# Patient Record
Sex: FEMALE | Race: BLACK OR AFRICAN AMERICAN | NOT HISPANIC OR LATINO | Employment: FULL TIME | URBAN - METROPOLITAN AREA
[De-identification: names, ages, dates, MRNs, and addresses within clinical notes are randomized per-mention and may not be internally consistent; named-entity substitution may affect disease eponyms.]

---

## 2019-03-15 ENCOUNTER — OFFICE VISIT (OUTPATIENT)
Dept: OBSTETRICS AND GYNECOLOGY | Facility: CLINIC | Age: 39
End: 2019-03-15

## 2019-03-15 VITALS
SYSTOLIC BLOOD PRESSURE: 128 MMHG | WEIGHT: 176.6 LBS | DIASTOLIC BLOOD PRESSURE: 70 MMHG | BODY MASS INDEX: 32.5 KG/M2 | HEIGHT: 62 IN

## 2019-03-15 DIAGNOSIS — Z34.90 PREGNANCY AT EARLY STAGE: Primary | ICD-10-CM

## 2019-03-15 PROCEDURE — 99203 OFFICE O/P NEW LOW 30 MIN: CPT | Performed by: OBSTETRICS & GYNECOLOGY

## 2019-03-15 RX ORDER — CLONAZEPAM 1 MG/1
1 TABLET ORAL 2 TIMES DAILY PRN
COMMUNITY
End: 2019-03-28 | Stop reason: HOSPADM

## 2019-03-15 RX ORDER — PNV NO.95/FERROUS FUM/FOLIC AC 28MG-0.8MG
1 TABLET ORAL DAILY
Qty: 30 TABLET | Refills: 11 | Status: SHIPPED | OUTPATIENT
Start: 2019-03-15

## 2019-03-15 NOTE — PROGRESS NOTES
"Subjective   Chief Complaint   Patient presents with   • Pregnancy Ultrasound     Sarah Goel is a 38 y.o. year old .  Patient's last menstrual period was 2019 (exact date).  She presents to be seen because of positive pregnancy test. Patient reports LMP as 2109. Patient reports positive at home UPT 3 weeks ago. She reports history of irregular cycles since last July. TVUS today showed SIUP with CRL measuring 5w6d with FHR: 121 bpm; multiple fibroids present. Patient denies vaginal bleeding or nausea. Reports mild breast tenderness. Patient reports history of anxiety that she occasional takes Klonopin; she reports it is very infrequent. She smokes 1 pack of cigarettes per day. Patient has a history of pre-eclampsia with G1.     OTHER THINGS SHE WANTS TO DISCUSS TODAY:  Nothing else    The following portions of the patient's history were reviewed and updated as appropriate:current medications, allergies, past family history, past medical history, past social history and past surgical history    Social History    Tobacco Use      Smoking status: Current Every Day Smoker        Packs/day: 1.00        Types: Cigarettes      Smokeless tobacco: Never Used    Review of Systems  Constitutional POS: nothing reported    NEG: anorexia or night sweats   Genitourinary POS: nothing reported    NEG: dysuria or hematuria   Gastointestinal POS: nothing reported    NEG: bloating, change in bowel habits, melena or reflux symptoms   Integument POS: nothing reported    NEG: moles that are changing in size, shape, color or rashes   Breast POS: nothing reported    NEG: persistent breast lump, skin dimpling or nipple discharge         Objective   /70   Ht 157.5 cm (62\")   Wt 80.1 kg (176 lb 9.6 oz)   LMP 2019 (Exact Date)   Breastfeeding? No   BMI 32.30 kg/m²     General:  well developed; well nourished  no acute distress   Skin:  Not performed.   Thyroid: not examined   Lungs:  breathing is unlabored "   Heart:  Not performed.   Breasts:  Not performed.   Abdomen: Not performed.   Pelvis: Not performed.     Lab Review   No data reviewed    Imaging   Pelvic ultrasound report        Assessment   1. Early SIUP     Plan   1. Reviewed ultrasound findings with patient. Plan to repeat ultrasound in 2 weeks to confirm viability.  2. Discussed smoking cessation as well as discontinuation of Klonopin.   3. Discussed history of pre-eclampsia with G1 and need for initiation of baby aspirin at 12 weeks.  4. Discussed AMA status and genetic screening.  5. Prescription for prenatal vitamins sent to pharmacy.  6. Instructed patient to call with any vaginal bleeding.   7. The importance of keeping all planned follow-up and taking all medications as prescribed was emphasized.  8. Follow up for ultrasound and NOB in 2 weeks.     New Medications Ordered This Visit   Medications   • Prenatal Vit-Fe Fumarate-FA (PRENATAL VITAMIN) 27-0.8 MG tablet     Sig: Take 1 tablet by mouth Daily.     Dispense:  30 tablet     Refill:  11         Total time spent today with Sarah  was 35 minutes (level 3).  Off this time, 100% was spent face-to-face time coordinating care, answering her questions and counseling regarding pathophysiology of her presenting problem along with plans for any diagnositc work-up and treatment.    This note was electronically signed.    Eliza Seymour, DO  March 15, 2019    Note: Speech recognition transcription software may have been used to create portions of this document.  An attempt at proofreading has been made but errors in transcription could still be present.

## 2019-03-28 ENCOUNTER — INITIAL PRENATAL (OUTPATIENT)
Dept: OBSTETRICS AND GYNECOLOGY | Facility: CLINIC | Age: 39
End: 2019-03-28

## 2019-03-28 ENCOUNTER — LAB (OUTPATIENT)
Dept: LAB | Facility: HOSPITAL | Age: 39
End: 2019-03-28

## 2019-03-28 VITALS — SYSTOLIC BLOOD PRESSURE: 114 MMHG | BODY MASS INDEX: 32.63 KG/M2 | WEIGHT: 178.4 LBS | DIASTOLIC BLOOD PRESSURE: 62 MMHG

## 2019-03-28 DIAGNOSIS — O09.91 ENCOUNTER FOR SUPERVISION OF HIGH RISK PREGNANCY IN FIRST TRIMESTER, ANTEPARTUM: ICD-10-CM

## 2019-03-28 DIAGNOSIS — Z72.0 TOBACCO ABUSE: ICD-10-CM

## 2019-03-28 DIAGNOSIS — O09.91 ENCOUNTER FOR SUPERVISION OF HIGH RISK PREGNANCY IN FIRST TRIMESTER, ANTEPARTUM: Primary | ICD-10-CM

## 2019-03-28 DIAGNOSIS — O09.521 ELDERLY MULTIGRAVIDA IN FIRST TRIMESTER: ICD-10-CM

## 2019-03-28 DIAGNOSIS — O09.291 HISTORY OF PRE-ECLAMPSIA IN PRIOR PREGNANCY, CURRENTLY PREGNANT IN FIRST TRIMESTER: ICD-10-CM

## 2019-03-28 LAB
ABO GROUP BLD: NORMAL
AMPHET+METHAMPHET UR QL: NEGATIVE
AMPHETAMINES UR QL: NEGATIVE
BARBITURATES UR QL SCN: NEGATIVE
BASOPHILS # BLD AUTO: 0.02 10*3/MM3 (ref 0–0.2)
BASOPHILS NFR BLD AUTO: 0.2 % (ref 0–1)
BENZODIAZ UR QL SCN: NEGATIVE
BLD GP AB SCN SERPL QL: NEGATIVE
BUPRENORPHINE SERPL-MCNC: NEGATIVE NG/ML
CANNABINOIDS SERPL QL: NEGATIVE
COCAINE UR QL: NEGATIVE
DEPRECATED RDW RBC AUTO: 44.8 FL (ref 37–54)
EOSINOPHIL # BLD AUTO: 0.09 10*3/MM3 (ref 0–0.3)
EOSINOPHIL NFR BLD AUTO: 0.7 % (ref 0–3)
ERYTHROCYTE [DISTWIDTH] IN BLOOD BY AUTOMATED COUNT: 13.8 % (ref 11.3–14.5)
HBV SURFACE AG SERPL QL IA: NORMAL
HCT VFR BLD AUTO: 36.5 % (ref 34.5–44)
HCV AB SER DONR QL: NORMAL
HGB BLD-MCNC: 12.4 G/DL (ref 11.5–15.5)
HIV1+2 AB SER QL: NORMAL
IMM GRANULOCYTES # BLD AUTO: 0.05 10*3/MM3 (ref 0–0.05)
IMM GRANULOCYTES NFR BLD AUTO: 0.4 % (ref 0–0.6)
LYMPHOCYTES # BLD AUTO: 2.77 10*3/MM3 (ref 0.6–4.8)
LYMPHOCYTES NFR BLD AUTO: 22.6 % (ref 24–44)
MCH RBC QN AUTO: 30 PG (ref 27–31)
MCHC RBC AUTO-ENTMCNC: 34 G/DL (ref 32–36)
MCV RBC AUTO: 88.4 FL (ref 80–99)
METHADONE UR QL SCN: NEGATIVE
MONOCYTES # BLD AUTO: 0.88 10*3/MM3 (ref 0–1)
MONOCYTES NFR BLD AUTO: 7.2 % (ref 0–12)
NEUTROPHILS # BLD AUTO: 8.47 10*3/MM3 (ref 1.5–8.3)
NEUTROPHILS NFR BLD AUTO: 69.3 % (ref 41–71)
OPIATES UR QL: NEGATIVE
OXYCODONE UR QL SCN: NEGATIVE
PCP UR QL SCN: NEGATIVE
PLATELET # BLD AUTO: 297 10*3/MM3 (ref 150–450)
PMV BLD AUTO: 9.6 FL (ref 6–12)
PROPOXYPH UR QL: NEGATIVE
RBC # BLD AUTO: 4.13 10*6/MM3 (ref 3.89–5.14)
RH BLD: NEGATIVE
RUBV IGG SER QL: NORMAL
RUBV IGG SER-ACNC: 98.5 IU/ML
TRICYCLICS UR QL SCN: NEGATIVE
WBC NRBC COR # BLD: 12.23 10*3/MM3 (ref 3.5–10.8)

## 2019-03-28 PROCEDURE — 87086 URINE CULTURE/COLONY COUNT: CPT

## 2019-03-28 PROCEDURE — 86803 HEPATITIS C AB TEST: CPT | Performed by: OBSTETRICS & GYNECOLOGY

## 2019-03-28 PROCEDURE — 80306 DRUG TEST PRSMV INSTRMNT: CPT

## 2019-03-28 PROCEDURE — 99214 OFFICE O/P EST MOD 30 MIN: CPT | Performed by: OBSTETRICS & GYNECOLOGY

## 2019-03-28 PROCEDURE — 85025 COMPLETE CBC W/AUTO DIFF WBC: CPT | Performed by: OBSTETRICS & GYNECOLOGY

## 2019-03-28 PROCEDURE — 36415 COLL VENOUS BLD VENIPUNCTURE: CPT | Performed by: OBSTETRICS & GYNECOLOGY

## 2019-03-28 PROCEDURE — 87340 HEPATITIS B SURFACE AG IA: CPT | Performed by: OBSTETRICS & GYNECOLOGY

## 2019-03-28 PROCEDURE — 80081 OBSTETRIC PANEL INC HIV TSTG: CPT | Performed by: OBSTETRICS & GYNECOLOGY

## 2019-03-28 PROCEDURE — 86762 RUBELLA ANTIBODY: CPT | Performed by: OBSTETRICS & GYNECOLOGY

## 2019-03-28 PROCEDURE — G0432 EIA HIV-1/HIV-2 SCREEN: HCPCS | Performed by: OBSTETRICS & GYNECOLOGY

## 2019-03-28 PROCEDURE — 86900 BLOOD TYPING SEROLOGIC ABO: CPT | Performed by: OBSTETRICS & GYNECOLOGY

## 2019-03-28 PROCEDURE — 86901 BLOOD TYPING SEROLOGIC RH(D): CPT | Performed by: OBSTETRICS & GYNECOLOGY

## 2019-03-28 PROCEDURE — 86850 RBC ANTIBODY SCREEN: CPT | Performed by: OBSTETRICS & GYNECOLOGY

## 2019-03-28 RX ORDER — CITALOPRAM 10 MG/1
10 TABLET ORAL DAILY
COMMUNITY
End: 2019-09-05

## 2019-03-28 NOTE — PROGRESS NOTES
Subjective   Chief Complaint   Patient presents with   • Initial Prenatal Visit     New Ob visit       Sarah Goel is a 38 y.o. year old .  Patient's last menstrual period was 2019 (exact date).  She presents to be seen to initiate prenatal care.     Social History    Tobacco Use      Smoking status: Current Every Day Smoker        Packs/day: 1.00        Types: Cigarettes      Smokeless tobacco: Never Used      The following portions of the patient's history were reviewed and updated as appropriate:vital signs, allergies, current medications, past family history, past medical history, past social history, past surgical history and problem list.    Objective    General: well developed; well nourished  no acute distress   Skin: No suspicious lesions seen   Thyroid: normal to inspection and palpation   Heart:  Not performed.   Lungs: breathing is unlabored   Breasts:  Examined in supine position  Symmetric without masses or skin dimpling  Nipples normal without inversion, lesions or discharge  There are no palpable axillary nodes   Abdomen: soft, non-tender; no masses  no umbilical or inguinal hernias are present  no hepato-splenomegaly   Pelvis: Clinical staff was present for exam  External genitalia:  normal appearance of the external genitalia including Bartholin's and Olancha's glands.  :  urethral meatus normal;  Vaginal:  normal pink mucosa without prolapse or lesions.  Cervix:  normal appearance.  Uterus:  normal size, shape and consistency. symmetrically enlarged, consisent with 8 weeks size;  Adnexa:  normal bimanual exam of the adnexa.     Lab Review   No data reviewed    Imaging   Pelvic ultrasound report    Assessment/Plan   ASSESSMENT  1. 38 y.o. year old  at 7w5d  2. Supervision of high risk pregnancy   3. Advanced Maternal Age  4. Tobacco Abuse  5. History of Pre-eclampsia with G1    PLAN  1. The problem list for pregnancy was initiated today.  2. EDC of 2019 was established  by T1 ultasound not consistent with LMP.  3. Tests ordered today:  Orders Placed This Encounter   Procedures   • Urine Culture - Urine, Urine, Clean Catch     Standing Status:   Future     Standing Expiration Date:   3/28/2020   • Chlamydia trachomatis, Neisseria gonorrhoeae, Trichomonas vaginalis, PCR - Swab, Cervix     Standing Status:   Future     Standing Expiration Date:   4/27/2019   • OB Panel With HIV   • Urine Drug Screen - Urine, Clean Catch     Standing Status:   Future     Standing Expiration Date:   3/28/2020     4. Testing for GC / Chlamydia / trichomonas was done today   5. Pap and HPV were done today.  If she does not receive the results of the Pap within 2 weeks  time, she was instructed to call to find out the results.   6. Discussed Aspirin 81 mg use beginning at 12 weeks for pre-eclampsia prevention. Also plan to collect baseline labs and 24 hour urine at next visit.   7. Genetic testing reviewed: NIPT ([facility])  8. Information reviewed: smoking in pregnancy, exercise in pregnancy, nutrition in pregnancy, weight gain in pregnancy, work and travel restrictions during pregnancy, list of OTC medications acceptable in pregnancy and call coverage groups    Total time spent today with Sarah  was 30 minutes (level 4).  Off this time, 80% was spent face-to-face time coordinating care, answering her questions and counseling regarding pathophysiology of her presenting problem along with plans for any diagnositc work-up and treatment.    Follow up: 4 week(s)       This note was electronically signed.    Eliza Seymour, DO  March 28, 2019

## 2019-03-29 LAB — RPR SER QL: NORMAL

## 2019-03-30 LAB — BACTERIA SPEC AEROBE CULT: NORMAL

## 2019-04-09 RX ORDER — METRONIDAZOLE 500 MG/1
TABLET ORAL
Qty: 4 TABLET | Refills: 0 | Status: SHIPPED | OUTPATIENT
Start: 2019-04-09 | End: 2019-07-17

## 2019-04-24 ENCOUNTER — ROUTINE PRENATAL (OUTPATIENT)
Dept: OBSTETRICS AND GYNECOLOGY | Facility: CLINIC | Age: 39
End: 2019-04-24

## 2019-04-24 VITALS — WEIGHT: 182.2 LBS | BODY MASS INDEX: 33.32 KG/M2 | DIASTOLIC BLOOD PRESSURE: 70 MMHG | SYSTOLIC BLOOD PRESSURE: 128 MMHG

## 2019-04-24 DIAGNOSIS — O09.521 ELDERLY MULTIGRAVIDA IN FIRST TRIMESTER: ICD-10-CM

## 2019-04-24 DIAGNOSIS — O09.291 HISTORY OF PRE-ECLAMPSIA IN PRIOR PREGNANCY, CURRENTLY PREGNANT IN FIRST TRIMESTER: ICD-10-CM

## 2019-04-24 DIAGNOSIS — O09.91 ENCOUNTER FOR SUPERVISION OF HIGH RISK PREGNANCY IN FIRST TRIMESTER, ANTEPARTUM: ICD-10-CM

## 2019-04-24 DIAGNOSIS — Z72.0 TOBACCO ABUSE: ICD-10-CM

## 2019-04-24 PROBLEM — A59.9 TRICHOMONAS VAGINALIS INFECTION: Status: ACTIVE | Noted: 2019-04-24

## 2019-04-24 PROCEDURE — 99213 OFFICE O/P EST LOW 20 MIN: CPT | Performed by: OBSTETRICS & GYNECOLOGY

## 2019-04-24 NOTE — PROGRESS NOTES
Chief Complaint   Patient presents with   • Routine Prenatal Visit       HPI: Sarah is a  currently at 11w4d who today reports the following:  Nausea - No; Vaginal bleeding -  No; Heartburn - No.    ROS:  GI: Constipation - No; Diarrhea - No    Neuro: Headache - No; Visual change - No      EXAM:  Vitals: See prenatal flowsheet   Abdomen: See prenatal flowsheet   Urine glucose/protein: See prenatal flowsheet   Pelvic: See prenatal flowsheet     Prenatal Labs  Lab Results   Component Value Date    HGB 12.4 2019    RUBELLAABIGG 98.5 2019    RUBELLAABIGG Immune 2019    HEPBSAG Non-Reactive 2019    ABSCRN Negative 2019    YNM3FOI0 Non-Reactive 2019    HEPCVIRUSABY Non-Reactive 2019    URINECX 30,000-40,000 CFU/mL Normal Urogenital [place] 2019       MDM:  Impression: 1. Supervision of high risk pregnancy  2. AMA  3. Tobacco use in pregnancy  4. History of Pre-eclampsia with G1   5. History of Trichomonas this pregnancy   Tests done today: 1. NIPT   2. Baseline 24 hour urine and pre-eclampsia labs   Topics discussed: 1. Continue with PNV's  2. Prenatal labs reviewed. Patient completed treatment for Trichomonas. Her  went to his doctor and tested negative and thus treatment was not given. They have refrained from intercourse. MAGED at next visit  3. Will start on baby aspirin today for history of pre-eclampsia with G1. Baseline 24 hour urine and labs ordered today.  4. none - she had no major complaints,questions or concerns   Tests scheduled today for her next visit:   MAGED for Trichomonas

## 2019-04-26 ENCOUNTER — LAB REQUISITION (OUTPATIENT)
Dept: LAB | Facility: HOSPITAL | Age: 39
End: 2019-04-26

## 2019-04-26 ENCOUNTER — LAB (OUTPATIENT)
Dept: LAB | Facility: HOSPITAL | Age: 39
End: 2019-04-26

## 2019-04-26 ENCOUNTER — TELEPHONE (OUTPATIENT)
Dept: OBSTETRICS AND GYNECOLOGY | Facility: CLINIC | Age: 39
End: 2019-04-26

## 2019-04-26 DIAGNOSIS — O09.91 ENCOUNTER FOR SUPERVISION OF HIGH RISK PREGNANCY IN FIRST TRIMESTER, ANTEPARTUM: ICD-10-CM

## 2019-04-26 DIAGNOSIS — O09.291 HISTORY OF PRE-ECLAMPSIA IN PRIOR PREGNANCY, CURRENTLY PREGNANT IN FIRST TRIMESTER: ICD-10-CM

## 2019-04-26 DIAGNOSIS — Z00.00 ROUTINE GENERAL MEDICAL EXAMINATION AT A HEALTH CARE FACILITY: ICD-10-CM

## 2019-04-26 LAB
ALP SERPL-CCNC: 43 U/L (ref 39–117)
ALT SERPL W P-5'-P-CCNC: 13 U/L (ref 1–33)
AST SERPL-CCNC: 12 U/L (ref 1–32)
BASOPHILS # BLD AUTO: 0.03 10*3/MM3 (ref 0–0.2)
BASOPHILS NFR BLD AUTO: 0.2 % (ref 0–1.5)
BILIRUB SERPL-MCNC: <0.2 MG/DL (ref 0.2–1.2)
CREAT BLD-MCNC: 0.39 MG/DL (ref 0.57–1)
DEPRECATED RDW RBC AUTO: 43.6 FL (ref 37–54)
EOSINOPHIL # BLD AUTO: 0.07 10*3/MM3 (ref 0–0.4)
EOSINOPHIL NFR BLD AUTO: 0.6 % (ref 0.3–6.2)
ERYTHROCYTE [DISTWIDTH] IN BLOOD BY AUTOMATED COUNT: 13.1 % (ref 12.3–15.4)
HCT VFR BLD AUTO: 34 % (ref 34–46.6)
HGB BLD-MCNC: 11.4 G/DL (ref 12–15.9)
IMM GRANULOCYTES # BLD AUTO: 0.08 10*3/MM3 (ref 0–0.05)
IMM GRANULOCYTES NFR BLD AUTO: 0.7 % (ref 0–0.5)
LDH SERPL-CCNC: 142 U/L (ref 135–214)
LYMPHOCYTES # BLD AUTO: 2.66 10*3/MM3 (ref 0.7–3.1)
LYMPHOCYTES NFR BLD AUTO: 21.7 % (ref 19.6–45.3)
MCH RBC QN AUTO: 30.2 PG (ref 26.6–33)
MCHC RBC AUTO-ENTMCNC: 33.5 G/DL (ref 31.5–35.7)
MCV RBC AUTO: 90.2 FL (ref 79–97)
MONOCYTES # BLD AUTO: 0.94 10*3/MM3 (ref 0.1–0.9)
MONOCYTES NFR BLD AUTO: 7.7 % (ref 5–12)
NEUTROPHILS # BLD AUTO: 8.46 10*3/MM3 (ref 1.7–7)
NEUTROPHILS NFR BLD AUTO: 69.1 % (ref 42.7–76)
NRBC BLD AUTO-RTO: 0 /100 WBC (ref 0–0.2)
PLATELET # BLD AUTO: 296 10*3/MM3 (ref 140–450)
PMV BLD AUTO: 10 FL (ref 6–12)
RBC # BLD AUTO: 3.77 10*6/MM3 (ref 3.77–5.28)
URATE SERPL-MCNC: 2.6 MG/DL (ref 2.4–5.7)
WBC NRBC COR # BLD: 12.24 10*3/MM3 (ref 3.4–10.8)

## 2019-04-26 PROCEDURE — 84460 ALANINE AMINO (ALT) (SGPT): CPT | Performed by: OBSTETRICS & GYNECOLOGY

## 2019-04-26 PROCEDURE — 84156 ASSAY OF PROTEIN URINE: CPT

## 2019-04-26 PROCEDURE — 84075 ASSAY ALKALINE PHOSPHATASE: CPT | Performed by: OBSTETRICS & GYNECOLOGY

## 2019-04-26 PROCEDURE — 36415 COLL VENOUS BLD VENIPUNCTURE: CPT

## 2019-04-26 PROCEDURE — 85025 COMPLETE CBC W/AUTO DIFF WBC: CPT

## 2019-04-26 PROCEDURE — 82565 ASSAY OF CREATININE: CPT | Performed by: OBSTETRICS & GYNECOLOGY

## 2019-04-26 PROCEDURE — 82247 BILIRUBIN TOTAL: CPT | Performed by: OBSTETRICS & GYNECOLOGY

## 2019-04-26 PROCEDURE — 84450 TRANSFERASE (AST) (SGOT): CPT | Performed by: OBSTETRICS & GYNECOLOGY

## 2019-04-26 PROCEDURE — 36415 COLL VENOUS BLD VENIPUNCTURE: CPT | Performed by: OBSTETRICS & GYNECOLOGY

## 2019-04-26 PROCEDURE — 81050 URINALYSIS VOLUME MEASURE: CPT

## 2019-04-26 PROCEDURE — 84550 ASSAY OF BLOOD/URIC ACID: CPT | Performed by: OBSTETRICS & GYNECOLOGY

## 2019-04-26 PROCEDURE — 83615 LACTATE (LD) (LDH) ENZYME: CPT | Performed by: OBSTETRICS & GYNECOLOGY

## 2019-04-26 NOTE — TELEPHONE ENCOUNTER
Spoke with pt advising her that  Lab had gotten a shipment of Panorama kits in and testing is available at her convienence

## 2019-04-26 NOTE — TELEPHONE ENCOUNTER
Lion Lara pt went to lab to get advanced maternal age test done but they've been out of the kit twice, so what do you want her to do.

## 2019-04-29 LAB
COLLECT DURATION TIME UR: 24 HRS
PROT 24H UR-MRATE: 154 MG/24HOURS (ref 0–150)
PROT UR-MCNC: 11 MG/DL
SPECIMEN VOL 24H UR: 1400 ML

## 2019-04-29 PROCEDURE — 81050 URINALYSIS VOLUME MEASURE: CPT

## 2019-04-29 PROCEDURE — 84156 ASSAY OF PROTEIN URINE: CPT

## 2019-05-01 ENCOUNTER — TELEPHONE (OUTPATIENT)
Dept: OBSTETRICS AND GYNECOLOGY | Facility: CLINIC | Age: 39
End: 2019-05-01

## 2019-05-01 NOTE — TELEPHONE ENCOUNTER
Dr. Seymour patient 12w4d  [phone] returned call to Ra and advised him that we do not have the results back from the NIPT, it takes 7-14 business days to get the result. aR verbalized understanding.

## 2019-05-03 ENCOUNTER — TELEPHONE (OUTPATIENT)
Dept: OBSTETRICS AND GYNECOLOGY | Facility: CLINIC | Age: 39
End: 2019-05-03

## 2019-05-22 ENCOUNTER — LAB (OUTPATIENT)
Dept: LAB | Facility: HOSPITAL | Age: 39
End: 2019-05-22

## 2019-05-22 ENCOUNTER — ROUTINE PRENATAL (OUTPATIENT)
Dept: OBSTETRICS AND GYNECOLOGY | Facility: CLINIC | Age: 39
End: 2019-05-22

## 2019-05-22 VITALS — WEIGHT: 179.4 LBS | SYSTOLIC BLOOD PRESSURE: 138 MMHG | BODY MASS INDEX: 32.81 KG/M2 | DIASTOLIC BLOOD PRESSURE: 76 MMHG

## 2019-05-22 DIAGNOSIS — Z72.0 TOBACCO ABUSE: ICD-10-CM

## 2019-05-22 DIAGNOSIS — O09.92 HIGH-RISK PREGNANCY IN SECOND TRIMESTER: ICD-10-CM

## 2019-05-22 DIAGNOSIS — O09.91 ENCOUNTER FOR SUPERVISION OF HIGH RISK PREGNANCY IN FIRST TRIMESTER, ANTEPARTUM: ICD-10-CM

## 2019-05-22 DIAGNOSIS — O09.292 HISTORY OF PRE-ECLAMPSIA IN PRIOR PREGNANCY, CURRENTLY PREGNANT IN SECOND TRIMESTER: ICD-10-CM

## 2019-05-22 DIAGNOSIS — O09.522 ELDERLY MULTIGRAVIDA IN SECOND TRIMESTER: ICD-10-CM

## 2019-05-22 DIAGNOSIS — A59.9 TRICHOMONAS VAGINALIS INFECTION: ICD-10-CM

## 2019-05-22 DIAGNOSIS — O09.521 ELDERLY MULTIGRAVIDA IN FIRST TRIMESTER: ICD-10-CM

## 2019-05-22 PROCEDURE — 99213 OFFICE O/P EST LOW 20 MIN: CPT | Performed by: OBSTETRICS & GYNECOLOGY

## 2019-05-22 PROCEDURE — 36415 COLL VENOUS BLD VENIPUNCTURE: CPT

## 2019-05-22 NOTE — PROGRESS NOTES
Chief Complaint   Patient presents with   • Routine Prenatal Visit       HPI: Sarah is a  currently at 15w4d who today reports the following:  Contractions - No; Leaking - No; Vaginal bleeding -  No; Heartburn - No.    ROS:  GI: Nausea - No ; Constipation - No; Diarrhea - No    Neuro: Headache - No ; Visual change - No      EXAM:  Vitals: See prenatal flowsheet   Abdomen: See prenatal flowsheet   Urine glucose/protein: See prenatal flowsheet   Pelvic: See prenatal flowsheet     Lab Results   Component Value Date    ABO A 2019    RH Negative 2019    ABSCRN Negative 2019       MDM:  Impression: 1. Supervision of high risk pregnancy  2. AMA  3. Tobacco use in pregnancy  4. History of Pre-eclampsia with G1   5. History of Trichomonas this pregnancy   Tests done today: 1. AFP   2. MAGED for Trichomonas   Topics discussed: 1. Continue with PNV's  2. Prenatal labs reviewed  3. none - she had no major complaints,questions or concerns   Tests scheduled today for her next visit:   U/S for anatomic screening at [facility] secondary to AMA status

## 2019-05-28 LAB — Lab: NORMAL

## 2019-05-29 ENCOUNTER — RESULTS ENCOUNTER (OUTPATIENT)
Dept: OBSTETRICS AND GYNECOLOGY | Facility: CLINIC | Age: 39
End: 2019-05-29

## 2019-05-29 DIAGNOSIS — A59.9 TRICHOMONAS VAGINALIS INFECTION: ICD-10-CM

## 2019-06-03 ENCOUNTER — TELEPHONE (OUTPATIENT)
Dept: OBSTETRICS AND GYNECOLOGY | Facility: CLINIC | Age: 39
End: 2019-06-03

## 2019-06-19 ENCOUNTER — OFFICE VISIT (OUTPATIENT)
Dept: OBSTETRICS AND GYNECOLOGY | Facility: HOSPITAL | Age: 39
End: 2019-06-19

## 2019-06-19 ENCOUNTER — HOSPITAL ENCOUNTER (OUTPATIENT)
Dept: WOMENS IMAGING | Facility: HOSPITAL | Age: 39
Discharge: HOME | End: 2019-06-19
Admitting: OBSTETRICS & GYNECOLOGY

## 2019-06-19 ENCOUNTER — ROUTINE PRENATAL (OUTPATIENT)
Dept: OBSTETRICS AND GYNECOLOGY | Facility: CLINIC | Age: 39
End: 2019-06-19

## 2019-06-19 VITALS — SYSTOLIC BLOOD PRESSURE: 133 MMHG | DIASTOLIC BLOOD PRESSURE: 85 MMHG | WEIGHT: 178.6 LBS | BODY MASS INDEX: 32.67 KG/M2

## 2019-06-19 DIAGNOSIS — O09.522 ELDERLY MULTIGRAVIDA IN SECOND TRIMESTER: Primary | ICD-10-CM

## 2019-06-19 DIAGNOSIS — Z72.0 TOBACCO ABUSE: ICD-10-CM

## 2019-06-19 DIAGNOSIS — O09.92 HIGH-RISK PREGNANCY IN SECOND TRIMESTER: Primary | ICD-10-CM

## 2019-06-19 DIAGNOSIS — O09.522 ELDERLY MULTIGRAVIDA IN SECOND TRIMESTER: ICD-10-CM

## 2019-06-19 DIAGNOSIS — O09.292 HISTORY OF PRE-ECLAMPSIA IN PRIOR PREGNANCY, CURRENTLY PREGNANT IN SECOND TRIMESTER: ICD-10-CM

## 2019-06-19 DIAGNOSIS — O44.40 LOW-LYING PLACENTA: ICD-10-CM

## 2019-06-19 DIAGNOSIS — Z67.91 RH NEGATIVE STATE IN ANTEPARTUM PERIOD: ICD-10-CM

## 2019-06-19 DIAGNOSIS — O26.899 RH NEGATIVE STATE IN ANTEPARTUM PERIOD: ICD-10-CM

## 2019-06-19 DIAGNOSIS — O09.92 HIGH-RISK PREGNANCY IN SECOND TRIMESTER: ICD-10-CM

## 2019-06-19 PROCEDURE — 99213 OFFICE O/P EST LOW 20 MIN: CPT | Performed by: OBSTETRICS & GYNECOLOGY

## 2019-06-19 PROCEDURE — 76811 OB US DETAILED SNGL FETUS: CPT | Performed by: OBSTETRICS & GYNECOLOGY

## 2019-06-19 PROCEDURE — 99241 PR OFFICE CONSULTATION NEW/ESTAB PATIENT 15 MIN: CPT | Performed by: OBSTETRICS & GYNECOLOGY

## 2019-06-19 PROCEDURE — 76811 OB US DETAILED SNGL FETUS: CPT

## 2019-06-19 RX ORDER — CLONAZEPAM 0.5 MG/1
0.5 TABLET ORAL 2 TIMES DAILY PRN
COMMUNITY
End: 2019-07-17

## 2019-06-19 NOTE — PROGRESS NOTES
Unsure if she is feeling any fetal movement. Had NIPS and MSAFP with normal results. Denies problems with pregnancy. Patient was very tearful during entire interview but did not share why. She takes medication prescribed by her PCP for anxiety. She does not see a counselor but is interested in speaking with one today. Spoke with Alma Pizarro; she will try to come see patient while she is here today.

## 2019-06-19 NOTE — PROGRESS NOTES
Patient went to her appointment with Dr. Seymour and will wait there to speak with , Alma Pizarro.

## 2019-06-19 NOTE — PROGRESS NOTES
Chief Complaint   Patient presents with   • Routine Prenatal Visit     saw [facility] today;  currently in room to speak w/pt, per [facility] pt requested to speak w/someone       HPI: Sarah is a  currently at 19w4d who today reports the following:  Contractions - No; Leaking - No; Vaginal bleeding -  No; Heartburn - No.    Patient is very tearful today. She reports that lately she and her  have been having issues.    ROS:  GI: Nausea - No ; Constipation - No; Diarrhea - No    Neuro: Headache - No ; Visual change - No      EXAM:  Vitals: See prenatal flowsheet   Abdomen: See prenatal flowsheet   Urine glucose/protein: See prenatal flowsheet   Pelvic: See prenatal flowsheet     Lab Results   Component Value Date    ABO A 2019    RH Negative 2019    ABSCRN Negative 2019       MDM:  Impression: 1. Supervision of high risk pregnancy  2. AMA  3. Tobacco use in pregnancy  4. Low Lying Placenta  5. Rh negative  6. History of Pre-eclampsia with G1   7. History of Trichomonas this pregnancy (MAGED on )   Tests done today: 1. U/S at [facility]   Topics discussed: 1. Continue with PNV's  2. Prenatal labs reviewed.  3. [facility] ultrasound reviewed. No fetal anomalies. Patient has follow up appointment at 32 weeks for fetal growth and reassessment of placental location.   4. Patient met with  today during visit. She is going to set up counseling for the patient. Patient currently denies SI/HI. She reports she is safe in her home. Instructed patient that if she is no longer safe or experiences SI/HI she needs to immediately call 911 and go to the nearest ED. Patient verbalized understanding.  5. none - she had no major complaints,questions or concerns   Tests scheduled today for her next visit:   none

## 2019-06-25 ENCOUNTER — TELEPHONE (OUTPATIENT)
Dept: OBSTETRICS AND GYNECOLOGY | Facility: CLINIC | Age: 39
End: 2019-06-25

## 2019-06-25 NOTE — TELEPHONE ENCOUNTER
Fish    Ob pt 20wks needs letter stating she cannot work over 8hrs a day. Please email it to:     [email or web address]

## 2019-06-25 NOTE — TELEPHONE ENCOUNTER
It is okay to provide the letter stating she can only work 8 hours a day. Just make sure she knows that it is ultimately up to her job though if they will honor these restrictions. Thanks.

## 2019-06-25 NOTE — TELEPHONE ENCOUNTER
Sarah states her work is currently having her work 13+hr shifts, she states at her last appt she spoke w/Alma rossi  about her current situation. Sarah states it has hard for her to work the 13+hr shifts w/everything going on in her personal life. Sarah states she can manage only working the 8 hrs. Sarah stated she does have an appt to speak with a therapist. Advised Sarah I would send this message to Dr. Seymour in regards to the letter requested.

## 2019-06-25 NOTE — TELEPHONE ENCOUNTER
Can we call Sraah and see what this is about? I'm happy to make adjustments in her work, however I need more information and she also needs to know that I'm unsure what ramifications this will have to her job eligibility. Thanks.

## 2019-07-16 PROBLEM — O44.40 LOW-LYING PLACENTA: Status: RESOLVED | Noted: 2019-06-19 | Resolved: 2019-07-16

## 2019-07-17 ENCOUNTER — ROUTINE PRENATAL (OUTPATIENT)
Dept: OBSTETRICS AND GYNECOLOGY | Facility: CLINIC | Age: 39
End: 2019-07-17

## 2019-07-17 VITALS — BODY MASS INDEX: 33.65 KG/M2 | DIASTOLIC BLOOD PRESSURE: 76 MMHG | SYSTOLIC BLOOD PRESSURE: 122 MMHG | WEIGHT: 184 LBS

## 2019-07-17 DIAGNOSIS — O09.292 HISTORY OF PRE-ECLAMPSIA IN PRIOR PREGNANCY, CURRENTLY PREGNANT IN SECOND TRIMESTER: ICD-10-CM

## 2019-07-17 DIAGNOSIS — Z72.0 TOBACCO ABUSE: ICD-10-CM

## 2019-07-17 DIAGNOSIS — O26.899 RH NEGATIVE STATE IN ANTEPARTUM PERIOD: ICD-10-CM

## 2019-07-17 DIAGNOSIS — Z67.91 RH NEGATIVE STATE IN ANTEPARTUM PERIOD: ICD-10-CM

## 2019-07-17 DIAGNOSIS — O09.522 ELDERLY MULTIGRAVIDA IN SECOND TRIMESTER: Primary | ICD-10-CM

## 2019-07-17 PROBLEM — A59.9 TRICHOMONAS VAGINALIS INFECTION: Status: RESOLVED | Noted: 2019-04-24 | Resolved: 2019-07-17

## 2019-07-17 PROBLEM — O99.330 TOBACCO USE IN PREGNANCY: Status: ACTIVE | Noted: 2019-03-28

## 2019-07-17 PROBLEM — F41.9 ANXIETY: Status: ACTIVE | Noted: 2017-01-01

## 2019-07-17 PROCEDURE — 99213 OFFICE O/P EST LOW 20 MIN: CPT | Performed by: OBSTETRICS & GYNECOLOGY

## 2019-07-17 NOTE — PROGRESS NOTES
Chief Complaint   Patient presents with   • Routine Prenatal Visit       HPI: Sarah is a  currently at 23w4d who today reports the following:  Contractions - No; Leaking - No; Vaginal bleeding -  No; Heartburn - No.    ROS:  GI: Nausea - No ; Constipation - No; Diarrhea - No    Neuro: Headache - No ; Visual change - No      EXAM:  Vitals: See prenatal flowsheet   Abdomen: See prenatal flowsheet   Urine glucose/protein: See prenatal flowsheet   Pelvic: See prenatal flowsheet     Lab Results   Component Value Date    ABO A 2019    RH Negative 2019    ABSCRN Negative 2019       MDM:  Impression: 1. Supervision of high risk pregnancy  2. Rh negative  3. AMA   Tests done today: 1. none   Topics discussed: 1. Continue with PNV's  2. Prenatal labs reviewed  3. I discussed tobacco use in pregnancy with Sarah.  The risk of continued use include: low birth weight and growth restriction in the fetus; placental abruption;  birth; in the first trimester, the risk of miscarraige is increased.  These effects are often related to the amount to tobacco used.  I encouraged Sarah to stop completely if at all possible.  If this is not an option, the nicotine replacement products including nicotine patches, gum and vaping are probobaly safer than tobacco.  However, these products are not as well studied and should not be considered safe in pregnancy.   4. Circumcision was discussed.  It was explained to Sarah that the procedure is elective.  There are probably no long-term medical benefits for circumcision.  Studies have suggested in the first year of life, there may be a reduction in urinary tract infections in a circumcised male's.  This difference disappears after the first year of life.  Some have suggested that circumcision reduces nerve endings and the tip of the penis which could have an impact to him as he ages.  There are small risks of the procedure including taking off to much or too little  skin from the foreskin.  He will be anesthetized and most of the time this is successful.  It cannot be guaranteed that the child will feel no pain.   Tests scheduled today for her next visit:   GCT  HgB  ABS

## 2019-08-07 ENCOUNTER — HOSPITAL ENCOUNTER (OUTPATIENT)
Facility: HOSPITAL | Age: 39
Setting detail: OBSERVATION
Discharge: HOME | End: 2019-08-08
Attending: OBSTETRICS & GYNECOLOGY | Admitting: OBSTETRICS & GYNECOLOGY

## 2019-08-08 VITALS
HEIGHT: 62 IN | DIASTOLIC BLOOD PRESSURE: 74 MMHG | RESPIRATION RATE: 16 BRPM | WEIGHT: 187 LBS | TEMPERATURE: 98.3 F | SYSTOLIC BLOOD PRESSURE: 113 MMHG | BODY MASS INDEX: 34.41 KG/M2

## 2019-08-08 PROBLEM — Z34.90 PREGNANCY: Status: ACTIVE | Noted: 2019-08-08

## 2019-08-08 LAB
BILIRUB UR QL STRIP: NEGATIVE
CLARITY UR: CLEAR
COLOR UR: YELLOW
GLUCOSE UR STRIP-MCNC: NEGATIVE MG/DL
HGB UR QL STRIP.AUTO: NEGATIVE
KETONES UR QL STRIP: ABNORMAL
LEUKOCYTE ESTERASE UR QL STRIP.AUTO: NEGATIVE
NITRITE UR QL STRIP: NEGATIVE
PH UR STRIP.AUTO: 6 [PH] (ref 5–8)
PROT UR QL STRIP: NEGATIVE
SP GR UR STRIP: 1.02 (ref 1–1.03)
UROBILINOGEN UR QL STRIP: ABNORMAL

## 2019-08-08 PROCEDURE — G0378 HOSPITAL OBSERVATION PER HR: HCPCS

## 2019-08-08 PROCEDURE — 81003 URINALYSIS AUTO W/O SCOPE: CPT | Performed by: OBSTETRICS & GYNECOLOGY

## 2019-08-08 PROCEDURE — 59025 FETAL NON-STRESS TEST: CPT

## 2019-08-08 PROCEDURE — 99218 PR INITIAL OBSERVATION CARE/DAY 30 MINUTES: CPT | Performed by: OBSTETRICS & GYNECOLOGY

## 2019-08-08 NOTE — H&P
\[facility]  Obstetric History and Physical    Referring Provider: Eliza Seymour DO      Chief Complaint   Patient presents with   • Contractions     Started codey around 2100. Not sure of frequency but they are regular.       Subjective     Patient is a 39 y.o. female  currently at 26w5d, who presents with C/O abdominal pain.  She reports sharp shooting mild intermittent lower abdominal pain without associated leaking fluid or vaginal bleeding.  Patient also reports normal fetal activity.  Patient denies any recent trauma or any other associated symptoms or predisposing factors.  Prenatal care by Dr. Seymour initially and now  by Dr. Mullen. .  course complicated by AMA.  OB history significant for one term vaginal delivery and 136-week vaginal delivery without complications.        The following portions of the patients history were reviewed and updated as appropriate: current medications, allergies, past medical history, past surgical history, past family history, past social history and problem list .       Prenatal Information:   Maternal Prenatal Labs  Blood Type No results found for: ABO   Rh Status No results found for: RH   Antibody Screen No results found for: ABSCRN   Gonnorhea No results found for: GCCX   Chlamydia No results found for: CLAMYDCU   RPR No results found for: RPR   Syphilis Antibody No results found for: SYPHILIS   Rubella No results found for: RUBELLAIGGIN   Hepatitis B Surface Antigen No results found for: HEPBSAG   HIV-1 Antibody No results found for: LABHIV1   Hepatitis C Antibody No results found for: HEPCAB   Rapid Urin Drug Screen No results found for: AMPMETHU, BARBITSCNUR, LABBENZSCN, LABMETHSCN, LABOPIASCN, THCURSCR, COCAINEUR, AMPHETSCREEN, PROPOXSCN, BUPRENORSCNU, METAMPSCNUR, OXYCODONESCN, TRICYCLICSCN   Group B Strep Culture No results found for: GBSANTIGEN           External Prenatal Results     Pregnancy Outside Results - Transcribed From Office  Records - See Scanned Records For Details     Test Value Date Time    Hgb 11.4 g/dL 19 1226    Hct 34.0 % 19 1226    ABO A  19 1114    Rh Negative  19 1114    Antibody Screen Negative  19 1114    Glucose Fasting GTT       Glucose Tolerance Test 1 hour       Glucose Tolerance Test 3 hour       Gonorrhea (discrete)       Chlamydia (discrete)       RPR Non-Reactive  19 1114    VDRL       Syphilis Antibody       Rubella 98.5 IU/mL 19 1114      Immune  19 1114    HBsAg Non-Reactive  19 1114    Herpes Simplex Virus PCR       Herpes Simplex VIrus Culture       HIV Non-Reactive  19 1114    Hep C RNA Quant PCR       Hep C Antibody Non-Reactive  19 1114    AFP       Group B Strep       GBS Susceptibility to Clindamycin       GBS Susceptibility to Erythromycin       Fetal Fibronectin       Genetic Testing, Maternal Blood             Drug Screening     Test Value Date Time    Urine Drug Screen       Amphetamine Screen Negative  19 1114    Barbiturate Screen Negative  19 1114    Benzodiazepine Screen Negative  19 1114    Methadone Screen Negative  19 1114    Phencyclidine Screen Negative  19 1114    Opiates Screen Negative  19 1114    THC Screen Negative  19 1114    Cocaine Screen       Propoxyphene Screen Negative  19 1114    Buprenorphine Screen Negative  19 1114    Methamphetamine Screen       Oxycodone Screen Negative  19 1114    Tricyclic Antidepressants Screen Negative  19 1114                  Past OB History:       Obstetric History       T1      L2     SAB0   TAB0   Ectopic0   Molar0   Multiple0   Live Births2       # Outcome Date GA Lbr David/2nd Weight Sex Delivery Anes PTL Lv   3 Current            2 Term 07 38w0d  2722 g (6 lb) F Vag-Spont  Y RED      Name: Ember   1  01 36w0d  3033 g (6 lb 11 oz) F Vag-Forceps  N RED      Name: Leonor      Complications:  Pre-eclampsia,Status post induction of labor      Obstetric Comments   1- Leonor (FOB #1)   2- Ember (FOB #2)   3- FOB #3       Past Medical History: Past Medical History:   Diagnosis Date   • Anxiety 2017   • History of pre-eclampsia 2001   • Trichomonas infection 05/2019      Past Surgical History Past Surgical History:   Procedure Laterality Date   • BREAST EXCISIONAL BIOPSY Right 1998    WNL   • LAPAROSCOPIC CHOLECYSTECTOMY  2014      Family History: Family History   Problem Relation Age of Onset   • Breast cancer Neg Hx    • Ovarian cancer Neg Hx    • Uterine cancer Neg Hx    • Endometrial cancer Neg Hx    • Colon cancer Neg Hx       Social History:  reports that she has been smoking cigarettes.  She started smoking about 26 years ago. She has been smoking about 1.00 pack per day. She has never used smokeless tobacco.   reports that she does not drink alcohol.   reports that she does not use drugs.                   General ROS Negative Findings:Headaches, Visual Changes, Epigastric pain, Anorexia, Nausia/Vomiting, ROM and Vaginal Bleeding    ROS     All other systems have been reviewed and are neg  Objective       Vital Signs Range for the last 24 hours  Temperature: Temp:  [98.3 °F (36.8 °C)] 98.3 °F (36.8 °C)   Temp Source: Temp src: Oral   BP: BP: (113-121)/(72-74) 113/74   Pulse:     Respirations: Resp:  [16] 16   SPO2:     O2 Amount (l/min):     O2 Devices     Weight: Weight:  [84.8 kg (187 lb)] 84.8 kg (187 lb)     Physical Examination:   General:   alert, appears stated age and cooperative   Skin:   normal   HEENT:  Sclera clear   Lungs:   clear to auscultation bilaterally   Heart:   regular rate and rhythm, S1, S2 normal, no murmur, click, rub or gallop   Abdomen:  Soft, gravid uterus, no guarding, rebound, negative CVA tenderness.  Benign exam   Lower Extremities  no edema, no calf tenderness   Pelvis:  External genitalia: normal general appearance  Uterus: enlarged     Neuro: DTRs 2% 4 no  clonus    Presentation:    Cervix: Exam by: Method: sterile exam per physician   Dilation:  Closed   Effacement:  Thick/firm   Station:  Engaged       Fetal Heart Rate Assessment   Method: Fetal HR Assessment Method: external   Beats/min: Fetal HR (beats/min): 140   Baseline: Fetal Heart Baseline Rate: normal range   Varibility: Fetal HR Variability: minimal (detectable, amplitude less than or equal to 5 bpm)   Accels: Fetal HR Accelerations: absent   Decels: Fetal HR Decelerations: variable   Tracing Category:       Uterine Assessment   Method: Method: external tocotransducer   Frequency (min): Contraction Frequency (Minutes): occasional   Ctx Count in 10 min:     Duration:     Intensity: Contraction Intensity: no contractions   Intensity by IUPC:     Resting Tone: Uterine Resting Tone: soft by palpation   Resting Tone by IUPC:     [place] Units:       Laboratory Results:   Lab Results (last 24 hours)     Procedure Component Value Units Date/Time    Urinalysis With Microscopic If Indicated (No Culture) - Urine, Clean Catch [[id number]]  (Abnormal) Collected:  08/08/19 0008    Specimen:  Urine, Clean Catch Updated:  08/08/19 0107     Color, UA Yellow     Appearance, UA Clear     pH, UA 6.0     Specific Gravity, UA 1.018     Glucose, UA Negative     Ketones, UA Trace     Bilirubin, UA Negative     Blood, UA Negative     Protein, UA Negative     Leuk Esterase, UA Negative     Nitrite, UA Negative     Urobilinogen, UA 1.0 E.U./dL    Narrative:       Urine microscopic not indicated.        Radiology Review:   Imaging Results (last 24 hours)     ** No results found for the last 24 hours. **        Other Studies:    Assessment/Plan       Elderly multigravida in second trimester    Tobacco use in pregnancy    Rh (-)    Pregnancy        Assessment:  1.  Intrauterine pregnancy at 26w5d weeks gestation with reassuring fetal status.    2.  No evidence of labor rupture membranes  3.  Discomforts of pregnancy  4.  See  diagnosis above    Plan:  1. discharge to home,  labor instructions given, kick count given, follow-up with OB routinely.  2. Plan of care has been reviewed with patient.  3.  Risks, benefits of treatment plan have been discussed.  4.  All questions have been answered.  5      Ernesto Rosenberg DO  2019  1:15 AM

## 2019-08-26 ENCOUNTER — ROUTINE PRENATAL (OUTPATIENT)
Dept: OBSTETRICS AND GYNECOLOGY | Facility: CLINIC | Age: 39
End: 2019-08-26

## 2019-08-26 ENCOUNTER — LAB (OUTPATIENT)
Dept: LAB | Facility: HOSPITAL | Age: 39
End: 2019-08-26

## 2019-08-26 VITALS — BODY MASS INDEX: 35.23 KG/M2 | DIASTOLIC BLOOD PRESSURE: 70 MMHG | SYSTOLIC BLOOD PRESSURE: 132 MMHG | WEIGHT: 192.6 LBS

## 2019-08-26 DIAGNOSIS — O09.523 ELDERLY MULTIGRAVIDA IN THIRD TRIMESTER: ICD-10-CM

## 2019-08-26 DIAGNOSIS — Z67.91 RH NEGATIVE STATE IN ANTEPARTUM PERIOD: ICD-10-CM

## 2019-08-26 DIAGNOSIS — O09.293 HISTORY OF PRE-ECLAMPSIA IN PRIOR PREGNANCY, CURRENTLY PREGNANT IN THIRD TRIMESTER: ICD-10-CM

## 2019-08-26 DIAGNOSIS — O26.899 RH NEGATIVE STATE IN ANTEPARTUM PERIOD: ICD-10-CM

## 2019-08-26 DIAGNOSIS — O09.522 ELDERLY MULTIGRAVIDA IN SECOND TRIMESTER: ICD-10-CM

## 2019-08-26 LAB
BLD GP AB SCN SERPL QL: NEGATIVE
GLUCOSE 1H P 100 G GLC PO SERPL-MCNC: 226 MG/DL
HCT VFR BLD AUTO: 35.1 % (ref 34–46.6)
HGB BLD-MCNC: 11.5 G/DL (ref 12–15.9)

## 2019-08-26 PROCEDURE — 82950 GLUCOSE TEST: CPT

## 2019-08-26 PROCEDURE — 36415 COLL VENOUS BLD VENIPUNCTURE: CPT

## 2019-08-26 PROCEDURE — 85014 HEMATOCRIT: CPT

## 2019-08-26 PROCEDURE — 85018 HEMOGLOBIN: CPT

## 2019-08-26 PROCEDURE — 99213 OFFICE O/P EST LOW 20 MIN: CPT | Performed by: OBSTETRICS & GYNECOLOGY

## 2019-08-26 PROCEDURE — 86850 RBC ANTIBODY SCREEN: CPT

## 2019-08-26 NOTE — PROGRESS NOTES
Chief Complaint   Patient presents with   • Routine Prenatal Visit     GTT today lab draw at 910; no c/o       HPI: Sarah is a  currently at 29w2d who today reports the following:  Contractions - No; Leaking - No; Vaginal bleeding -  No; Heartburn - No.    ROS:  GI: Nausea - No ; Constipation - No; Diarrhea - No    Neuro: Headache - No ; Visual change - No      EXAM:  Vitals: See prenatal flowsheet   Abdomen: See prenatal flowsheet   Urine glucose/protein: See prenatal flowsheet   Pelvic: See prenatal flowsheet     Lab Results   Component Value Date    ABO A 2019    RH Negative 2019    ABSCRN Negative 2019       MDM:  Impression: 1. Supervision of high risk pregnancy  2. AMA  3. Tobacco use in pregnancy  4. Low Lying Placenta  5. Rh negative  6. History of Pre-eclampsia with G1   7. History of Trichomonas this pregnancy (MAGED on )   Tests done today: 1. antibody screen  2. GCT  3. HgB   Topics discussed: 1. Continue with PNV's  2. Prenatal labs reviewed  3. breast feeding  4.  labor signs and symptoms  5. TDAP vaccination  6. fetal kick count instructions   Tests scheduled today for her next visit:   Rhogam

## 2019-08-27 DIAGNOSIS — O24.410 DIET CONTROLLED GESTATIONAL DIABETES MELLITUS (GDM) IN THIRD TRIMESTER: Primary | ICD-10-CM

## 2019-08-28 ENCOUNTER — TELEPHONE (OUTPATIENT)
Dept: OBSTETRICS AND GYNECOLOGY | Facility: CLINIC | Age: 39
End: 2019-08-28

## 2019-09-05 ENCOUNTER — OFFICE VISIT (OUTPATIENT)
Dept: ENDOCRINOLOGY | Facility: CLINIC | Age: 39
End: 2019-09-05

## 2019-09-05 VITALS
SYSTOLIC BLOOD PRESSURE: 104 MMHG | BODY MASS INDEX: 34.74 KG/M2 | OXYGEN SATURATION: 98 % | DIASTOLIC BLOOD PRESSURE: 60 MMHG | HEIGHT: 62 IN | HEART RATE: 113 BPM | WEIGHT: 188.8 LBS

## 2019-09-05 DIAGNOSIS — O24.410 DIET CONTROLLED GESTATIONAL DIABETES MELLITUS (GDM) IN THIRD TRIMESTER: Primary | ICD-10-CM

## 2019-09-05 LAB
GLUCOSE BLDC GLUCOMTR-MCNC: 85 MG/DL (ref 70–130)
HBA1C MFR BLD: 5.8 %

## 2019-09-05 PROCEDURE — 82947 ASSAY GLUCOSE BLOOD QUANT: CPT | Performed by: INTERNAL MEDICINE

## 2019-09-05 PROCEDURE — 99243 OFF/OP CNSLTJ NEW/EST LOW 30: CPT | Performed by: INTERNAL MEDICINE

## 2019-09-05 PROCEDURE — 83036 HEMOGLOBIN GLYCOSYLATED A1C: CPT | Performed by: INTERNAL MEDICINE

## 2019-09-05 RX ORDER — GLUCOSAMINE HCL/CHONDROITIN SU 500-400 MG
CAPSULE ORAL
Qty: 150 EACH | Refills: 5 | Status: ON HOLD | OUTPATIENT
Start: 2019-09-05 | End: 2019-10-29

## 2019-09-05 RX ORDER — LANCETS 30 GAUGE
EACH MISCELLANEOUS
Qty: 100 EACH | Refills: 5 | Status: ON HOLD | OUTPATIENT
Start: 2019-09-05 | End: 2019-10-29

## 2019-09-05 RX ORDER — CHLORPHENIR/PHENYLEPH/ASPIRIN 2-7.8-325
1 TABLET, EFFERVESCENT ORAL DAILY
Qty: 50 STRIP | Refills: 2 | Status: ON HOLD | OUTPATIENT
Start: 2019-09-05 | End: 2019-10-29

## 2019-09-05 NOTE — ASSESSMENT & PLAN NOTE
Pp 1 hour gtt high   a1c mildly elevated at 5.8%  She will begin testing sugars fasting and 2 hours after meals and will fax blood sugars weekly to [email or web address].  We will plan to see her back in 3-4 weeks, or sooner if problems or questions.  Thank you for this referral and please do not hesitate to call for any problems or questions.

## 2019-09-05 NOTE — PROGRESS NOTES
Sarah Goel 39 y.o.  CC:Establish Care (new patient being seen at the request of Eliza Seymour DO for a consultation regarding gestational diabetes, STACIE 19)      [place]: Establish Care (new patient being seen at the request of Eliza Seymour DO for a consultation regarding gestational diabetes, STACIE 19)  is  with 19 yo (weight 6 lb 11 oz at 34 weeks, preeclampsia) and 11 yo (weight 6 lb at 36 weeks)  Blood sugar reviewed  Results for orders placed or performed in visit on 19   POC Glycosylated Hemoglobin (Hb A1C)   Result Value Ref Range    Hemoglobin A1C 5.8 %   POC Glucose Fingerstick   Result Value Ref Range    Glucose 85 70 - 130 mg/dL   hgn a1c most suggestive of prediabetes   Gtt sugar at 1 hour 226  We discussed the diagnosis of pre diabetes and reviewed her glucose levels/ glucose tolerance test.  We discussed the concept of carbohydrate awaredness and carbohydrate content of meals was discussed.  Impact of sweets and other carb containing foods and types of foods typically high in carbohydrates was discussed. Overall guidelines for meal planning related to specific carbohydrate content of meals was discussed and she was provided recommendations about carbohydrates recommended with meals and with snacks.  She was asked to give up any sugared drinks, and avoid breakfast cereal.  Blood sugar testing fasting and after each meal was reviewed and the patient was taught to use a glucometer to test her blood sugar.  Normal values for blood sugar monitoring were discussed as well, with fasting level less than 95, 1 hour pp blood sugar less than 140 and 2 hour blood sugar less than 120 recommended.  Risks related to poor control of blood sugars during pregnancy were discussed with a focus on specific risks to both her and the baby.  Risks discussed include macrosomia (large birthweight), fetal lung immaturity with respiratory distress and low oxygen level, stillbirth, low blood sugar after delivery,  high bilirubin/jaundice, and hypocalcemia.  Use of medications during pregnancy (eg metformin, glyburide and insulin) was discussed.  Her long term risks (outside of pregnancy) for development of Diabetes Mellitus were reviewed and we discussed the importance of healthy lifestyle now and in the future to help reduce the risk of progression to diabetes.      No Known Allergies    Current Outpatient Medications:   •  aspirin 81 MG tablet, Take 1 tablet by mouth Daily., Disp: 30 tablet, Rfl: 6  •  citalopram (CeleXA) 10 MG tablet, Take 10 mg by mouth Daily., Disp: , Rfl:   •  Prenatal Vit-Fe Fumarate-FA (PRENATAL VITAMIN) 27-0.8 MG tablet, Take 1 tablet by mouth Daily., Disp: 30 tablet, Rfl: 11  Patient Active Problem List    Diagnosis   • Diet controlled gestational diabetes mellitus (GDM) in third trimester [O24.410]   • Pregnancy [Z34.90]   • Rh (-) [O09.899, Z67.91]   • Elderly multigravida in third trimester [O09.523]   • Tobacco use in pregnancy [O99.330]   • History of pre-eclampsia in prior pregnancy, currently pregnant in third trimester [O09.293]   • Anxiety [F41.9]     Review of Systems   Constitutional: Positive for fatigue and unexpected weight change. Negative for activity change, appetite change, chills, diaphoresis and fever.   HENT: Negative for congestion, dental problem, drooling, ear discharge, ear pain, facial swelling, hearing loss, mouth sores, nosebleeds, postnasal drip, rhinorrhea, sinus pressure, sneezing, sore throat, tinnitus, trouble swallowing and voice change.    Eyes: Negative for photophobia, pain, discharge, redness, itching and visual disturbance.   Respiratory: Negative for apnea, cough, choking, chest tightness, shortness of breath, wheezing and stridor.    Cardiovascular: Negative for chest pain, palpitations and leg swelling.   Gastrointestinal: Positive for abdominal pain, diarrhea and nausea. Negative for abdominal distention, anal bleeding, blood in stool, constipation, rectal  pain and vomiting.   Endocrine: Negative for cold intolerance, heat intolerance, polydipsia, polyphagia and polyuria.   Genitourinary: Negative for decreased urine volume, difficulty urinating, dysuria, enuresis, flank pain, frequency, genital sores, hematuria and urgency.        Pregnant, USI    Musculoskeletal: Negative for arthralgias, back pain, gait problem, joint swelling, myalgias, neck pain and neck stiffness.   Skin: Negative for color change, pallor, rash and wound.   Allergic/Immunologic: Negative for environmental allergies, food allergies and immunocompromised state.   Neurological: Positive for numbness. Negative for dizziness, tremors, seizures, syncope, facial asymmetry, speech difficulty, weakness, light-headedness and headaches.   Hematological: Negative for adenopathy. Does not bruise/bleed easily.   Psychiatric/Behavioral: Positive for decreased concentration, dysphoric mood and sleep disturbance. Negative for agitation, behavioral problems, confusion, hallucinations, self-injury and suicidal ideas. The patient is nervous/anxious. The patient is not hyperactive.      Social History     Socioeconomic History   • Marital status: Unknown     Spouse name: Not on file   • Number of children: Not on file   • Years of education: Not on file   • Highest education level: Not on file   Tobacco Use   • Smoking status: Current Every Day Smoker     Packs/day: 1.00     Types: Cigarettes     Start date: 1993   • Smokeless tobacco: Never Used   Substance and Sexual Activity   • Alcohol use: No     Frequency: Never   • Drug use: No     Comment: History of Marijuana Use   • Sexual activity: Yes     Partners: Male     Birth control/protection: None     Family History   Problem Relation Age of Onset   • Arthritis Mother    • Thyroid disease Mother    • Thyroid disease Sister    • Diabetes Maternal Grandmother    • Hypertension Maternal Grandmother    • Breast cancer Neg Hx    • Ovarian cancer Neg Hx    • Uterine  "cancer Neg Hx    • Endometrial cancer Neg Hx    • Colon cancer Neg Hx      /60   Pulse 113   Ht 157.5 cm (62\")   Wt 85.6 kg (188 lb 12.8 oz)   LMP 01/13/2019 (Exact Date)   SpO2 98%   BMI 34.53 kg/m²   Physical Exam   Constitutional: She is oriented to person, place, and time. She appears well-developed and well-nourished.   HENT:   Head: Normocephalic and atraumatic.   Nose: Nose normal.   Mouth/Throat: Oropharynx is clear and moist.   Eyes: Conjunctivae, EOM and lids are normal. Pupils are equal, round, and reactive to light.   Neck: Trachea normal and normal range of motion. Neck supple. Carotid bruit is not present. No tracheal deviation present. No thyroid mass and no thyromegaly present.   Cardiovascular: Normal rate, regular rhythm, normal heart sounds and intact distal pulses. Exam reveals no gallop and no friction rub.   No murmur heard.  Pulmonary/Chest: Effort normal and breath sounds normal. No respiratory distress. She has no wheezes.   Musculoskeletal: Normal range of motion. She exhibits no edema or deformity.   Lymphadenopathy:     She has no cervical adenopathy.   Neurological: She is alert and oriented to person, place, and time. She has normal reflexes. She displays normal reflexes. No cranial nerve deficit.   Skin: Skin is warm and dry. No rash noted. No cyanosis or erythema. Nails show no clubbing.   Acanthosis    Psychiatric: She has a normal mood and affect. Her speech is normal and behavior is normal. Judgment and thought content normal. Cognition and memory are normal.   Nursing note and vitals reviewed.    Results for orders placed or performed in visit on 08/26/19   Glucose, Post 50 Gm Glucola   Result Value Ref Range    Gestational  mg/dL   Hemoglobin & Hematocrit, Blood   Result Value Ref Range    Hemoglobin 11.5 (L) 12.0 - 15.9 g/dL    Hematocrit 35.1 34.0 - 46.6 %   Antibody Screen   Result Value Ref Range    Antibody Screen Negative      Sarah was seen today for " establish care.    Diagnoses and all orders for this visit:    Diet controlled gestational diabetes mellitus (GDM) in third trimester  -     POC Glycosylated Hemoglobin (Hb A1C)  -     POC Glucose Fingerstick      Problem List Items Addressed This Visit        Endocrine    Diet controlled gestational diabetes mellitus (GDM) in third trimester - Primary     Pp 1 hour gtt high   a1c mildly elevated at 5.8%  She will begin testing sugars fasting and 2 hours after meals and will fax blood sugars weekly to [email or web address].  We will plan to see her back in 3-4 weeks, or sooner if problems or questions.  Thank you for this referral and please do not hesitate to call for any problems or questions.           Relevant Orders    POC Glycosylated Hemoglobin (Hb A1C) (Completed)    POC Glucose Fingerstick (Completed)        Return in about 4 weeks (around 10/3/2019) for Recheck 30 min .    Geovanna Wilson MA  Signed Charisse Scott MD

## 2019-09-09 ENCOUNTER — ROUTINE PRENATAL (OUTPATIENT)
Dept: OBSTETRICS AND GYNECOLOGY | Facility: CLINIC | Age: 39
End: 2019-09-09

## 2019-09-09 VITALS — WEIGHT: 188.7 LBS | DIASTOLIC BLOOD PRESSURE: 78 MMHG | BODY MASS INDEX: 34.51 KG/M2 | SYSTOLIC BLOOD PRESSURE: 138 MMHG

## 2019-09-09 DIAGNOSIS — O24.410 DIET CONTROLLED GESTATIONAL DIABETES MELLITUS (GDM) IN THIRD TRIMESTER: ICD-10-CM

## 2019-09-09 DIAGNOSIS — O09.523 ELDERLY MULTIGRAVIDA IN THIRD TRIMESTER: ICD-10-CM

## 2019-09-09 DIAGNOSIS — O09.293 HISTORY OF PRE-ECLAMPSIA IN PRIOR PREGNANCY, CURRENTLY PREGNANT IN THIRD TRIMESTER: ICD-10-CM

## 2019-09-09 DIAGNOSIS — Z67.91 RH NEGATIVE STATE IN ANTEPARTUM PERIOD: ICD-10-CM

## 2019-09-09 DIAGNOSIS — O26.899 RH NEGATIVE STATE IN ANTEPARTUM PERIOD: ICD-10-CM

## 2019-09-09 PROCEDURE — 99213 OFFICE O/P EST LOW 20 MIN: CPT | Performed by: OBSTETRICS & GYNECOLOGY

## 2019-09-09 PROCEDURE — 96372 THER/PROPH/DIAG INJ SC/IM: CPT | Performed by: OBSTETRICS & GYNECOLOGY

## 2019-09-09 NOTE — PROGRESS NOTES
Chief Complaint   Patient presents with   • Routine Prenatal Visit     no c/o; Rhogam today       HPI: Sarah is a  currently at 31w2d who today reports the following:  Contractions - No; Leaking - No; Vaginal bleeding -  No; Swelling of extremities - No.    ROS:  GI: Nausea - No; Constipation - No; Diarrhea - No    Neuro: Headache - No; Visual change - No      EXAM:  Vitals: See prenatal flowsheet   Abdomen: See prenatal flowsheet   Urine glucose/protein: See prenatal flowsheet   Pelvic: See prenatal flowsheet   MDM:   Impression: 1. Supervision of high risk pregnancy  2. AMA  3. A1GDM  4. Tobacco use in pregnancy  5. Low Lying Placenta  6. Rh negative  7. History of Pre-eclampsia with G1   8. History of Trichomonas this pregnancy (MAGED on )   Tests done today: 1. RHogam   Topics discussed: 1. Continue with PNV's  2. Prenatal labs reviewed  3. Has seen Sonia for GDM.   4. Follow up with [facility] on .  5. labor signs and symptoms  6. symptoms of preeclampsia  7. fetal kick count instructions   Tests scheduled today for her next visit:   none

## 2019-09-17 ENCOUNTER — TELEPHONE (OUTPATIENT)
Dept: INTERNAL MEDICINE | Facility: CLINIC | Age: 39
End: 2019-09-17

## 2019-09-17 NOTE — TELEPHONE ENCOUNTER
Yes I received her email- I replied as well.  Please have her check her junk folder if she did not receive.  Thanks, chet

## 2019-09-17 NOTE — TELEPHONE ENCOUNTER
PATIENT IS CALLING TO SEE IF WE RECEIVED HER BLOOD SUGAR LOG THAT SHE EMAILED DR. MEHTA. SHE ALSO WANTS TO KNOW WHAT HER A1C WAS. PATIENTS NUMBER -[phone]

## 2019-09-17 NOTE — TELEPHONE ENCOUNTER
I lmom to advise her with A1C result and also told her I would check with [facility] to ask if she received the emailed blood sugars  Please advise

## 2019-09-18 ENCOUNTER — HOSPITAL ENCOUNTER (OUTPATIENT)
Dept: WOMENS IMAGING | Facility: HOSPITAL | Age: 39
Discharge: HOME | End: 2019-09-18
Admitting: OBSTETRICS & GYNECOLOGY

## 2019-09-18 ENCOUNTER — OFFICE VISIT (OUTPATIENT)
Dept: OBSTETRICS AND GYNECOLOGY | Facility: HOSPITAL | Age: 39
End: 2019-09-18

## 2019-09-18 VITALS — WEIGHT: 186 LBS | BODY MASS INDEX: 34.02 KG/M2 | SYSTOLIC BLOOD PRESSURE: 116 MMHG | DIASTOLIC BLOOD PRESSURE: 61 MMHG

## 2019-09-18 DIAGNOSIS — O09.523 ELDERLY MULTIGRAVIDA IN THIRD TRIMESTER: ICD-10-CM

## 2019-09-18 DIAGNOSIS — O09.522 ELDERLY MULTIGRAVIDA IN SECOND TRIMESTER: ICD-10-CM

## 2019-09-18 DIAGNOSIS — O09.293 HISTORY OF PRE-ECLAMPSIA IN PRIOR PREGNANCY, CURRENTLY PREGNANT IN THIRD TRIMESTER: ICD-10-CM

## 2019-09-18 DIAGNOSIS — Z3A.32 32 WEEKS GESTATION OF PREGNANCY: ICD-10-CM

## 2019-09-18 DIAGNOSIS — O44.40 LOW-LYING PLACENTA: ICD-10-CM

## 2019-09-18 DIAGNOSIS — O09.292 HISTORY OF PRE-ECLAMPSIA IN PRIOR PREGNANCY, CURRENTLY PREGNANT IN SECOND TRIMESTER: ICD-10-CM

## 2019-09-18 DIAGNOSIS — O24.410 DIET CONTROLLED GESTATIONAL DIABETES MELLITUS (GDM) IN THIRD TRIMESTER: Primary | ICD-10-CM

## 2019-09-18 PROCEDURE — 76816 OB US FOLLOW-UP PER FETUS: CPT

## 2019-09-18 PROCEDURE — 76816 OB US FOLLOW-UP PER FETUS: CPT | Performed by: OBSTETRICS & GYNECOLOGY

## 2019-09-18 NOTE — PROGRESS NOTES
"Pt denies all s/s PTL.  Reports \"fasting glucoses are between ,  2 hours after meals average 117.\"  CHRISTELLE Scott managing GDM.  Next OB appt 9/23/19.  NIPT-low risk, male.  "

## 2019-09-19 ENCOUNTER — TELEPHONE (OUTPATIENT)
Dept: ENDOCRINOLOGY | Facility: CLINIC | Age: 39
End: 2019-09-19

## 2019-09-19 NOTE — TELEPHONE ENCOUNTER
New insulin start- please have her take 4 units before breakfast and dinner bid.  She does not currently need to dose before lunch

## 2019-09-20 ENCOUNTER — TELEPHONE (OUTPATIENT)
Dept: ENDOCRINOLOGY | Facility: CLINIC | Age: 39
End: 2019-09-20

## 2019-09-20 RX ORDER — INSULIN LISPRO 100 U/ML
INJECTION, SOLUTION SUBCUTANEOUS
Qty: 15 ML | Refills: 2 | Status: ON HOLD | OUTPATIENT
Start: 2019-09-20 | End: 2019-10-29

## 2019-09-20 NOTE — TELEPHONE ENCOUNTER
Pt called stating the humalog is not covered on insurance  Pharmacy was advised to try to run admelog insulin and to call back if not covered

## 2019-09-23 ENCOUNTER — ROUTINE PRENATAL (OUTPATIENT)
Dept: OBSTETRICS AND GYNECOLOGY | Facility: CLINIC | Age: 39
End: 2019-09-23

## 2019-09-23 VITALS — WEIGHT: 188.4 LBS | BODY MASS INDEX: 34.46 KG/M2 | DIASTOLIC BLOOD PRESSURE: 60 MMHG | SYSTOLIC BLOOD PRESSURE: 118 MMHG

## 2019-09-23 DIAGNOSIS — Z67.91 RH NEGATIVE STATE IN ANTEPARTUM PERIOD: ICD-10-CM

## 2019-09-23 DIAGNOSIS — O26.899 RH NEGATIVE STATE IN ANTEPARTUM PERIOD: ICD-10-CM

## 2019-09-23 DIAGNOSIS — O09.293 HISTORY OF PRE-ECLAMPSIA IN PRIOR PREGNANCY, CURRENTLY PREGNANT IN THIRD TRIMESTER: ICD-10-CM

## 2019-09-23 DIAGNOSIS — O24.414 INSULIN CONTROLLED GESTATIONAL DIABETES MELLITUS (GDM) IN THIRD TRIMESTER: ICD-10-CM

## 2019-09-23 DIAGNOSIS — O09.523 ELDERLY MULTIGRAVIDA IN THIRD TRIMESTER: ICD-10-CM

## 2019-09-23 PROBLEM — O36.63X0 EXCESSIVE FETAL GROWTH AFFECTING MANAGEMENT OF PREGNANCY IN THIRD TRIMESTER: Status: ACTIVE | Noted: 2019-09-23

## 2019-09-23 PROCEDURE — 99213 OFFICE O/P EST LOW 20 MIN: CPT | Performed by: OBSTETRICS & GYNECOLOGY

## 2019-09-23 NOTE — PROGRESS NOTES
Chief Complaint   Patient presents with   • Routine Prenatal Visit     no c/o       HPI: Sarah is a  currently at 33w2d who today reports the following:  Contractions - YES - but less than 4/hour AND no associated change in vaginal discharge; Leaking - No; Vaginal bleeding -  No; Swelling of extremities - No.    ROS:  GI: Nausea - No; Constipation - No; Diarrhea - No    Neuro: Headache - No; Visual change - No      EXAM:  Vitals: See prenatal flowsheet   Abdomen: See prenatal flowsheet   Urine glucose/protein: See prenatal flowsheet   Pelvic: See prenatal flowsheet   MDM:   Impression: 1. Supervision of high risk pregnancy  2. AMA  3. A2GDM -- on Admelog 4 units with breakfast and dinner  4. Large for Gestational Age  5. Tobacco use in pregnancy  6. Low Lying Placenta -- resolved  7. Rh negative -- s/p Rhogam  8. History of Pre-eclampsia with G1   9. History of Trichomonas this pregnancy (MAGED on )   Tests done today: 1. none   Topics discussed: 1. Continue with PNV's  2. Prenatal labs reviewed  3. PDC ultrasound reviewed. Low lying placenta resolved. EFW 2649 g (84% with AC >99%). Patient has follow up in four weeks.  4. Patient was initiated on insulin last week. She had coverage issues initially so she is picking up insulin today. Given initiation of insulin, will begin  testing at next visit.   5. labor signs and symptoms  6.  labor signs and symptoms  7. fetal kick count instructions   Tests scheduled today for her next visit:   FRAN

## 2019-10-01 ENCOUNTER — TELEPHONE (OUTPATIENT)
Dept: OBSTETRICS AND GYNECOLOGY | Facility: CLINIC | Age: 39
End: 2019-10-01

## 2019-10-01 ENCOUNTER — ROUTINE PRENATAL (OUTPATIENT)
Dept: OBSTETRICS AND GYNECOLOGY | Facility: CLINIC | Age: 39
End: 2019-10-01

## 2019-10-01 VITALS — BODY MASS INDEX: 34.17 KG/M2 | SYSTOLIC BLOOD PRESSURE: 116 MMHG | WEIGHT: 186.8 LBS | DIASTOLIC BLOOD PRESSURE: 64 MMHG

## 2019-10-01 DIAGNOSIS — O09.523 ELDERLY MULTIGRAVIDA IN THIRD TRIMESTER: Primary | ICD-10-CM

## 2019-10-01 PROCEDURE — 99213 OFFICE O/P EST LOW 20 MIN: CPT | Performed by: OBSTETRICS & GYNECOLOGY

## 2019-10-01 RX ORDER — CLONAZEPAM 0.5 MG/1
0.5 TABLET ORAL 2 TIMES DAILY PRN
Status: ON HOLD | COMMUNITY
End: 2019-10-29

## 2019-10-01 NOTE — PROGRESS NOTES
Chief Complaint   Patient presents with   • Non-stress Test     No complaints       HPI: Sarah is a  currently at 34w3d who today reports the following:  Contractions - No; Leaking - No; Vaginal bleeding -  No; Swelling of extremities - No.    ROS:  GI: Nausea - No; Constipation - No; Diarrhea - No    Neuro: Headache - No; Visual change - No      EXAM:  Vitals: See prenatal flowsheet   Abdomen: See prenatal flowsheet   Urine glucose/protein: See prenatal flowsheet   Pelvic: See prenatal flowsheet   MDM:   Impression: 1. Supervision of high risk pregnancy  2. AMA  3. A2GDM -- on Admelog 6 units with breakfast and dinner  4. Large for Gestational Age  5. Tobacco use in pregnancy  6. Low Lying Placenta -- resolved  7. Rh negative -- s/p Rhogam  8. History of Pre-eclampsia with G1   9. History of Trichomonas this pregnancy (MAGED on )   Tests done today: 1. BPP -   2. NST - nonreactive   Topics discussed: 1. Continue with PNV's  2. Prenatal labs reviewed  3. labor signs and symptoms  4.  labor signs and symptoms  5. symptoms of preeclampsia   Tests scheduled today for her next visit:   BPP

## 2019-10-02 ENCOUNTER — TELEPHONE (OUTPATIENT)
Dept: ENDOCRINOLOGY | Facility: CLINIC | Age: 39
End: 2019-10-02

## 2019-10-03 ENCOUNTER — OFFICE VISIT (OUTPATIENT)
Dept: ENDOCRINOLOGY | Facility: CLINIC | Age: 39
End: 2019-10-03

## 2019-10-03 VITALS
HEART RATE: 102 BPM | HEIGHT: 62 IN | DIASTOLIC BLOOD PRESSURE: 52 MMHG | OXYGEN SATURATION: 98 % | BODY MASS INDEX: 34.23 KG/M2 | WEIGHT: 186 LBS | SYSTOLIC BLOOD PRESSURE: 110 MMHG

## 2019-10-03 DIAGNOSIS — O24.414 INSULIN CONTROLLED GESTATIONAL DIABETES MELLITUS (GDM) IN THIRD TRIMESTER: Primary | ICD-10-CM

## 2019-10-03 PROCEDURE — 99213 OFFICE O/P EST LOW 20 MIN: CPT | Performed by: INTERNAL MEDICINE

## 2019-10-03 RX ORDER — TETANUS TOXOID, REDUCED DIPHTHERIA TOXOID AND ACELLULAR PERTUSSIS VACCINE, ADSORBED 5; 2.5; 8; 8; 2.5 [IU]/.5ML; [IU]/.5ML; UG/.5ML; UG/.5ML; UG/.5ML
SUSPENSION INTRAMUSCULAR
Refills: 0 | COMMUNITY
Start: 2019-09-23 | End: 2019-10-16

## 2019-10-03 NOTE — PROGRESS NOTES
Sarah Goel 39 y.o.  CC:Follow-up and Gestational Diabetes (STACIE 11-7-19 OB: Eliza Fish)    [place]: Follow-up and Gestational Diabetes (STACIE 11-7-19 OB: Eliza Seymour)    Is on nph and pre meal insulin   AM sugar 105-124  Pp sugars high as well - 112-175 with half of sugars over goal   Is sporadic with pp sugars - several higher numbers last week   Discussed my concern about high numbers and poor control   Will  bedtime insulin today - we had problems with her insurance covering insulin (long acting)  No rhyme or reason for higher sugars- eats almost the same meals but we did discuss raising premeal insulin if she is eating a meal that has caused a higher sugar previously   occ contractions  Baby moving well   bp is good but periorbital swelling with mild headache this week   Is off work now     No Known Allergies  No current facility-administered medications for this visit.   No current outpatient medications on file.    Facility-Administered Medications Ordered in Other Visits:   •  dextrose 5 % and lactated Ringer's infusion, 125 mL/hr, Intravenous, Continuous, Tate Mullen MD  •  diphenhydrAMINE (BENADRYL) capsule 25 mg, 25 mg, Oral, Q4H PRN, Tate Mullen MD  •  diphenhydrAMINE (BENADRYL) capsule 25 mg, 25 mg, Oral, Q4H PRN **OR** diphenhydrAMINE (BENADRYL) injection 25 mg, 25 mg, Intravenous, Q4H PRN **OR** diphenhydrAMINE (BENADRYL) injection 25 mg, 25 mg, Intramuscular, Q4H PRN, Lane Jalloh MD  •  docusate sodium (COLACE) capsule 100 mg, 100 mg, Oral, BID PRN, Tate Mullen MD  •  ibuprofen (ADVIL,MOTRIN) tablet 600 mg, 600 mg, Oral, Q6H PRN, Tate Mullen MD  •  influenza vac split quad (FLUZONE,FLUARIX,AFLURIA) injection 0.5 mL, 0.5 mL, Intramuscular, During Hospitalization, Tate Mullen MD  •  Measles, Mumps & Rubella Vac (MMR) injection 0.5 mL, 0.5 mL, Subcutaneous, During Hospitalization, Tate Mullen MD  •  naloxone (NARCAN) injection 0.4 mg, 0.4  mg, Intravenous, Code / Trauma / Sedation Continuous Med, Lane Jalloh MD  •  ondansetron (ZOFRAN) tablet 4 mg, 4 mg, Oral, Q6H PRN **OR** ondansetron (ZOFRAN) injection 4 mg, 4 mg, Intravenous, Q6H PRN, Tate Mullen MD  •  oxyCODONE-acetaminophen (PERCOCET) 5-325 MG per tablet 1 tablet, 1 tablet, Oral, Q4H PRN, 1 tablet at 10/29/19 1137 **OR** oxyCODONE-acetaminophen (PERCOCET) 5-325 MG per tablet 2 tablet, 2 tablet, Oral, Q4H PRN, Tate Mullen MD  •  oxytocin in sodium chloride (PITOCIN) 30 UNIT/500ML infusion solution  - ADS Override Pull, , , ,   •  [START ON 10/30/2019] polyethylene glycol 3350 powder (packet), 17 g, Oral, Daily PRN, Tate Mullen MD  •  promethazine (PHENERGAN) tablet 25 mg, 25 mg, Oral, Q6H PRN **OR** promethazine (PHENERGAN) injection 12.5 mg, 12.5 mg, Intramuscular, Q6H PRN **OR** promethazine (PHENERGAN) suppository 12.5 mg, 12.5 mg, Rectal, Q6H PRN, Tate Mullen MD  •  simethicone (MYLICON) chewable tablet 80 mg, 80 mg, Oral, 4x Daily PRN, Tate Mullen MD  •  Tdap (BOOSTRIX) injection 0.5 mL, 0.5 mL, Intramuscular, During Hospitalization, Tate Mullen MD  Patient Active Problem List    Diagnosis   • Postpartum care following C/S 10/29/19 - Richard Bowen [Z39.2]   • Rh (-) [O26.899, Z67.91]   • Anxiety [F41.9]     Review of Systems   Constitutional: Negative for activity change, appetite change, chills, diaphoresis, fatigue, fever and unexpected weight change.   HENT: Negative for congestion, dental problem, drooling, ear discharge, ear pain, facial swelling, hearing loss, mouth sores, nosebleeds, postnasal drip, rhinorrhea, sinus pressure, sneezing, sore throat, tinnitus, trouble swallowing and voice change.    Eyes: Negative for photophobia, pain, discharge, redness, itching and visual disturbance.   Respiratory: Negative for apnea, cough, choking, chest tightness, shortness of breath, wheezing and stridor.    Cardiovascular: Negative  for chest pain, palpitations and leg swelling.   Gastrointestinal: Negative for abdominal distention, abdominal pain, anal bleeding, blood in stool, constipation, diarrhea, nausea, rectal pain and vomiting.   Endocrine: Negative for cold intolerance, heat intolerance, polydipsia, polyphagia and polyuria.   Genitourinary: Negative for decreased urine volume, difficulty urinating, dysuria, enuresis, flank pain, frequency, genital sores, hematuria and urgency.   Musculoskeletal: Negative for arthralgias, back pain, gait problem, joint swelling, myalgias, neck pain and neck stiffness.   Skin: Negative for color change, pallor, rash and wound.   Allergic/Immunologic: Negative for environmental allergies, food allergies and immunocompromised state.   Neurological: Negative for dizziness, tremors, seizures, syncope, facial asymmetry, speech difficulty, weakness, light-headedness, numbness and headaches.   Hematological: Negative for adenopathy. Does not bruise/bleed easily.   Psychiatric/Behavioral: Negative for agitation, behavioral problems, confusion, decreased concentration, dysphoric mood, hallucinations, self-injury, sleep disturbance and suicidal ideas. The patient is not nervous/anxious and is not hyperactive.      Social History     Socioeconomic History   • Marital status: Single     Spouse name: Not on file   • Number of children: Not on file   • Years of education: Not on file   • Highest education level: Not on file   Tobacco Use   • Smoking status: Current Every Day Smoker     Packs/day: 1.00     Types: Cigarettes     Start date: 1993   • Smokeless tobacco: Never Used   Substance and Sexual Activity   • Alcohol use: No     Frequency: Never   • Drug use: No     Comment: History of Marijuana Use   • Sexual activity: Yes     Partners: Male     Birth control/protection: None     Comment: pregnant     Family History   Problem Relation Age of Onset   • Arthritis Mother    • Thyroid disease Mother    • Thyroid  "disease Sister    • Diabetes Maternal Grandmother    • Hypertension Maternal Grandmother    • Breast cancer Neg Hx    • Ovarian cancer Neg Hx    • Uterine cancer Neg Hx    • Endometrial cancer Neg Hx    • Colon cancer Neg Hx      /52   Pulse 102   Ht 157.5 cm (62\")   Wt 84.4 kg (186 lb)   LMP 01/13/2019 (Exact Date)   SpO2 98%   BMI 34.02 kg/m²   Physical Exam   Constitutional: She is oriented to person, place, and time. She appears well-developed and well-nourished.   HENT:   Head: Normocephalic and atraumatic.   Nose: Nose normal.   Mouth/Throat: Oropharynx is clear and moist.   Eyes: Conjunctivae, EOM and lids are normal. Pupils are equal, round, and reactive to light.   Neck: Trachea normal and normal range of motion. Neck supple. Carotid bruit is not present. No tracheal deviation present. No thyroid mass and no thyromegaly present.   Cardiovascular: Normal rate, regular rhythm, normal heart sounds and intact distal pulses. Exam reveals no gallop and no friction rub.   No murmur heard.  Pulmonary/Chest: Effort normal and breath sounds normal. No respiratory distress. She has no wheezes.   Musculoskeletal: Normal range of motion. She exhibits no edema or deformity.   Lymphadenopathy:     She has no cervical adenopathy.   Neurological: She is alert and oriented to person, place, and time. She has normal reflexes. She displays normal reflexes. No cranial nerve deficit.   Skin: Skin is warm and dry. No rash noted. No cyanosis or erythema. Nails show no clubbing.   Psychiatric: She has a normal mood and affect. Her speech is normal and behavior is normal. Judgment and thought content normal. Cognition and memory are normal.   Nursing note and vitals reviewed.    Results for orders placed or performed in visit on 09/05/19   POC Glycosylated Hemoglobin (Hb A1C)   Result Value Ref Range    Hemoglobin A1C 5.8 %   POC Glucose Fingerstick   Result Value Ref Range    Glucose 85 70 - 130 mg/dL     Problem List " Items Addressed This Visit        Endocrine    RESOLVED: Insulin controlled gestational diabetes mellitus (GDM) in third trimester - Primary     Blood sugars reviewed and fasting sugars are high- she will  long acting insulin to be taken at bedtime - has not started this yet  Also on premeal insulin with higher pp sugars at times  Is frustrated because she states there is no difference between meals which result in normal sugars and meals resulting in higher numbers  I did discuss foods that can cause higher sugars and we discussed raising premeal insulin for these types of meals or any meal that has results in a high sugar  She will try to be more diligent with dosing and testing   Baby is moving well   occ contractions   Risk for higher sugars to her and baby reviewed including c section/traumatic dleivery, low blood sugar, jaundice and stillbirth  She will email updated sugars on Sunday and will start NPH at [facility]   I have spent __25___ minutes face to face with the patient with___15__ minutes spent counseling (over half was related to counseling regarding management of her diabetes, food choices, blood sugar testing )             Return in about 13 weeks (around 1/2/2020) for Recheck 30 min .    Charisse Scott MD  Signed Charisse Scott MD

## 2019-10-04 NOTE — ASSESSMENT & PLAN NOTE
Blood sugars reviewed and fasting sugars are high- she will  long acting insulin to be taken at bedtime - has not started this yet  Also on premeal insulin with higher pp sugars at times  Is frustrated because she states there is no difference between meals which result in normal sugars and meals resulting in higher numbers  I did discuss foods that can cause higher sugars and we discussed raising premeal insulin for these types of meals or any meal that has results in a high sugar  She will try to be more diligent with dosing and testing   Baby is moving well   occ contractions   Risk for higher sugars to her and baby reviewed including c section/traumatic dleivery, low blood sugar, jaundice and stillbirth  She will email updated sugars on Sunday and will [facility] N[facility] at  [facility]   I have spent __25___ minutes face to face with the patient with___15__ minutes spent counseling (over half was related to counseling regarding management of her diabetes, food choices, blood sugar testing )

## 2019-10-08 ENCOUNTER — ROUTINE PRENATAL (OUTPATIENT)
Dept: OBSTETRICS AND GYNECOLOGY | Facility: CLINIC | Age: 39
End: 2019-10-08

## 2019-10-08 VITALS — SYSTOLIC BLOOD PRESSURE: 118 MMHG | WEIGHT: 186.4 LBS | BODY MASS INDEX: 34.09 KG/M2 | DIASTOLIC BLOOD PRESSURE: 72 MMHG

## 2019-10-08 DIAGNOSIS — O09.293 HISTORY OF PRE-ECLAMPSIA IN PRIOR PREGNANCY, CURRENTLY PREGNANT IN THIRD TRIMESTER: ICD-10-CM

## 2019-10-08 DIAGNOSIS — O24.414 INSULIN CONTROLLED GESTATIONAL DIABETES MELLITUS (GDM) IN THIRD TRIMESTER: ICD-10-CM

## 2019-10-08 DIAGNOSIS — Z67.91 RH NEGATIVE STATE IN ANTEPARTUM PERIOD: ICD-10-CM

## 2019-10-08 DIAGNOSIS — O09.523 ELDERLY MULTIGRAVIDA IN THIRD TRIMESTER: ICD-10-CM

## 2019-10-08 DIAGNOSIS — O26.899 RH NEGATIVE STATE IN ANTEPARTUM PERIOD: ICD-10-CM

## 2019-10-08 PROBLEM — O40.3XX0 POLYHYDRAMNIOS IN THIRD TRIMESTER: Status: ACTIVE | Noted: 2019-10-08

## 2019-10-08 PROCEDURE — 99213 OFFICE O/P EST LOW 20 MIN: CPT | Performed by: OBSTETRICS & GYNECOLOGY

## 2019-10-08 NOTE — PROGRESS NOTES
Chief Complaint   Patient presents with   • Routine Prenatal Visit     U/S before appt; c/o pelvic pressure       HPI: Sarah is a  currently at 35w3d who today reports the following:  Contractions - No; Leaking - No; Vaginal bleeding -  No; Swelling of extremities - No.    ROS:  GI: Nausea - No; Constipation - No; Diarrhea - No    Neuro: Headache - No; Visual change - No      EXAM:  Vitals: See prenatal flowsheet   Abdomen: See prenatal flowsheet   Urine glucose/protein: See prenatal flowsheet   Pelvic: See prenatal flowsheet   MDM:   Impression: 1. Supervision of high risk pregnancy  2. AMA  3.   A2GDM -- on Admelog 6 units with breakfast lunch and dinner  3. Large for Gestational Age  4. Polyhydramnios  5. Tobacco use in pregnancy  6. Low Lying Placenta -- resolved  7. Rh negative -- s/p Rhogam  8. History of Pre-eclampsia with G1   9. History of Trichomonas this pregnancy (MAGED on )   Tests done today: 1. BPP - 8  8  2. ANNA -- 30.8    Topics discussed: 1. Continue with PNV's  2. Prenatal labs reviewed  3. Given new diagnosis of poly and uncontrolled blood sugars, will increase  testing to twice a week with BPP on Tuesday and NST on Friday. Has follow up appointment with PDC next Wednesday.   4. labor signs and symptoms  5. fetal kick count instructions   Tests scheduled today for her next visit:   NST and GBS

## 2019-10-10 ENCOUNTER — HOSPITAL ENCOUNTER (OUTPATIENT)
Facility: HOSPITAL | Age: 39
Discharge: HOME | End: 2019-10-11
Attending: OBSTETRICS & GYNECOLOGY | Admitting: OBSTETRICS & GYNECOLOGY

## 2019-10-10 PROCEDURE — 59025 FETAL NON-STRESS TEST: CPT

## 2019-10-10 PROCEDURE — 81003 URINALYSIS AUTO W/O SCOPE: CPT | Performed by: OBSTETRICS & GYNECOLOGY

## 2019-10-11 ENCOUNTER — ROUTINE PRENATAL (OUTPATIENT)
Dept: OBSTETRICS AND GYNECOLOGY | Facility: CLINIC | Age: 39
End: 2019-10-11

## 2019-10-11 VITALS — DIASTOLIC BLOOD PRESSURE: 82 MMHG | RESPIRATION RATE: 16 BRPM | SYSTOLIC BLOOD PRESSURE: 130 MMHG | TEMPERATURE: 98.4 F

## 2019-10-11 VITALS — DIASTOLIC BLOOD PRESSURE: 60 MMHG | SYSTOLIC BLOOD PRESSURE: 128 MMHG | WEIGHT: 186.6 LBS | BODY MASS INDEX: 34.13 KG/M2

## 2019-10-11 VITALS
BODY MASS INDEX: 34.23 KG/M2 | HEART RATE: 88 BPM | SYSTOLIC BLOOD PRESSURE: 120 MMHG | TEMPERATURE: 98.8 F | WEIGHT: 186 LBS | DIASTOLIC BLOOD PRESSURE: 59 MMHG | HEIGHT: 62 IN | RESPIRATION RATE: 16 BRPM

## 2019-10-11 DIAGNOSIS — O09.293 HISTORY OF PRE-ECLAMPSIA IN PRIOR PREGNANCY, CURRENTLY PREGNANT IN THIRD TRIMESTER: ICD-10-CM

## 2019-10-11 DIAGNOSIS — O09.523 ELDERLY MULTIGRAVIDA IN THIRD TRIMESTER: ICD-10-CM

## 2019-10-11 DIAGNOSIS — Z67.91 RH NEGATIVE STATE IN ANTEPARTUM PERIOD: ICD-10-CM

## 2019-10-11 DIAGNOSIS — O26.899 RH NEGATIVE STATE IN ANTEPARTUM PERIOD: ICD-10-CM

## 2019-10-11 DIAGNOSIS — O24.414 INSULIN CONTROLLED GESTATIONAL DIABETES MELLITUS (GDM) IN THIRD TRIMESTER: ICD-10-CM

## 2019-10-11 LAB
BILIRUB UR QL STRIP: NEGATIVE
CLARITY UR: CLEAR
COLOR UR: YELLOW
GLUCOSE UR STRIP-MCNC: NEGATIVE MG/DL
GP B STREP RRNA SPEC QL PROBE: POSITIVE
HGB UR QL STRIP.AUTO: NEGATIVE
KETONES UR QL STRIP: NEGATIVE
LEUKOCYTE ESTERASE UR QL STRIP.AUTO: NEGATIVE
NITRITE UR QL STRIP: NEGATIVE
PH UR STRIP.AUTO: 6.5 [PH] (ref 5–8)
PROT UR QL STRIP: NEGATIVE
SP GR UR STRIP: 1.02 (ref 1–1.03)
UROBILINOGEN UR QL STRIP: NORMAL

## 2019-10-11 PROCEDURE — 59025 FETAL NON-STRESS TEST: CPT

## 2019-10-11 PROCEDURE — 99213 OFFICE O/P EST LOW 20 MIN: CPT | Performed by: OBSTETRICS & GYNECOLOGY

## 2019-10-11 PROCEDURE — 59025 FETAL NON-STRESS TEST: CPT | Performed by: OBSTETRICS & GYNECOLOGY

## 2019-10-11 PROCEDURE — G0463 HOSPITAL OUTPT CLINIC VISIT: HCPCS

## 2019-10-11 NOTE — PROGRESS NOTES
Chief Complaint   Patient presents with   • Routine Prenatal Visit     NST @ 1317pm, GBS today; pelvic pressure, cx       HPI: Sarah is a  currently at 35w6d who today reports the following:  Contractions - YES - was evaluated on L&D early this morning; she reports contractions have decreased in frequency; Leaking - No; Vaginal bleeding -  No; Swelling of extremities - No.    ROS:  GI: Nausea - No; Constipation - No; Diarrhea - No    Neuro: Headache - No; Visual change - No      EXAM:  Vitals: See prenatal flowsheet   Abdomen: See prenatal flowsheet   Urine glucose/protein: See prenatal flowsheet   Pelvic: See prenatal flowsheet   MDM:   Impression: 1. Supervision of high risk pregnancy  2. AMA  3.   A2GDM -- on Admelog 6 units with breakfast lunch and dinner  4. Polyhydramnios  3. Tobacco use in pregnancy  4. Large for Gestational Age  5. Low Lying Placenta -- resolved  6. Rh negative -- s/p Rhogam  7. History of Pre-eclampsia with G1   8. History of Trichomonas this pregnancy (MAGED on )   Tests done today: 1. NST - nonreactive, deceleration present   Topics discussed: 1. Continue with PNV's  2. Prenatal labs reviewed  3. Will send to L&D for prolonged monitoring.  4. labor signs and symptoms  5. symptoms of preeclampsia  6. fetal kick count instructions   Tests scheduled today for her next visit:   FRAN

## 2019-10-11 NOTE — H&P
[place]  Obstetric History and Physical    Chief Complaint   Patient presents with   • sharp lower abd pain       HPI:    Patient is a 39 y.o. female  currently at 35w6d, who presents with lower abdominal pain which was actually contractions.   On TOCO she was codey initially every 1-2 minutes, but mild.   She received PO hydration and they spaced out and became far less frequent.   She denies leaking or vaginal bleeding.   She was /-1 on SVE.             The following portions of the patients history were reviewed and updated as appropriate: current medications, allergies, past medical history, past surgical history, past family history, past social history and problem list .       Prenatal Information:   Prenatal Labs  Lab Results   Component Value Date    HEPBSAG Non-Reactive 2019    ABO A 2019    RH Negative 2019    ABSCRN Negative 2019    HEPCVIRUSABY Non-Reactive 2019         External Prenatal Results     Pregnancy Outside Results - Transcribed From Office Records - See Scanned Records For Details     Test Value Date Time    Hgb 11.5 g/dL 19 0908    Hct 35.1 % 19 0908    ABO A  19 1114    Rh Negative  19 1114    Antibody Screen Negative  19 0908    Glucose Fasting GTT       Glucose Tolerance Test 1 hour       Glucose Tolerance Test 3 hour       Gonorrhea (discrete)       Chlamydia (discrete)       RPR Non-Reactive  19 1114    VDRL       Syphilis Antibody       Rubella 98.5 IU/mL 19 1114      Immune  19 1114    HBsAg Non-Reactive  19 1114    Herpes Simplex Virus PCR       Herpes Simplex VIrus Culture       HIV Non-Reactive  19 1114    Hep C RNA Quant PCR       Hep C Antibody Non-Reactive  19 1114    AFP       Group B Strep       GBS Susceptibility to Clindamycin       GBS Susceptibility to Erythromycin       Fetal Fibronectin       Genetic Testing, Maternal Blood             Drug Screening      Test Value Date Time    Urine Drug Screen       Amphetamine Screen Negative  19 1114    Barbiturate Screen Negative  19 1114    Benzodiazepine Screen Negative  19 1114    Methadone Screen Negative  19 1114    Phencyclidine Screen Negative  19 1114    Opiates Screen Negative  19 1114    THC Screen Negative  19 1114    Cocaine Screen       Propoxyphene Screen Negative  19 1114    Buprenorphine Screen Negative  19 1114    Methamphetamine Screen       Oxycodone Screen Negative  19 1114    Tricyclic Antidepressants Screen Negative  19 1114                  Past OB History:       Obstetric History       T1      L2     SAB0   TAB0   Ectopic0   Molar0   Multiple0   Live Births2       # Outcome Date GA Lbr David/2nd Weight Sex Delivery Anes PTL Lv   3 Current            2 Term 07 38w0d  2722 g (6 lb) F Vag-Spont  Y RED      Name: Ember   1  01 36w0d  3033 g (6 lb 11 oz) F Vag-Forceps  N RED      Name: Leonor      Complications: Pre-eclampsia,Status post induction of labor      Obstetric Comments   1- Leonor (FOB #1)   2- Ember (FOB #2)   3- FOB #3       Past Medical History: Past Medical History:   Diagnosis Date   • Anxiety    • Diabetes mellitus (CMS/HCC)     GDM    • History of pre-eclampsia    • Trichomonas infection 2019      Past Surgical History Past Surgical History:   Procedure Laterality Date   • BREAST EXCISIONAL BIOPSY Right     WNL   • LAPAROSCOPIC CHOLECYSTECTOMY        Family History: Family History   Problem Relation Age of Onset   • Arthritis Mother    • Thyroid disease Mother    • Thyroid disease Sister    • Diabetes Maternal Grandmother    • Hypertension Maternal Grandmother    • Breast cancer Neg Hx    • Ovarian cancer Neg Hx    • Uterine cancer Neg Hx    • Endometrial cancer Neg Hx    • Colon cancer Neg Hx       Social History:  reports that she has been smoking cigarettes.  She started  smoking about 26 years ago. She has been smoking about 1.00 pack per day. She has never used smokeless tobacco.   reports that she does not drink alcohol.   reports that she does not use drugs.        General ROS: Denies fever, HA, shortness of air, muscle weakness, and rashes    Objective       Vital Signs Range for the last 24 hours  Temperature: Temp:  [98.4 °F (36.9 °C)] 98.4 °F (36.9 °C)   Temp Source:     BP: BP: (130)/(82) 130/82   Pulse:  90   Respirations: Resp:  [16] 16   SPO2:     O2 Amount (l/min):     O2 Devices     Weight:       Physical Examination: Alert and oriented X 3  Chest - clear to auscultation, no wheezes, rales or rhonchi, symmetric air entry  Heart - normal rate, S1, S2, no murmurs  Abdomen - no rebound tenderness noted  bowel sounds normal  Gravid uterus, No RUQ pain, No guarding  Extremities - Non-tender bilaterally    Presentation: Cephalic    Cervix: Exam by:  M.D.   Dilation:  2   Effacement:  70   Station:  -1       Fetal Heart Rate Assessment   Method:     Beats/min:     Baseline:  140s   Varibility:  mod   Accels:  y   Decels:  n   Tracing Category:  1     Uterine Assessment   Method:     Frequency (min):  initally 1-2 and then 6 /hour   Ctx Count in 10 min:     Duration:     Intensity:  mild    Intensity by IUPC:     Resting Tone:     Resting Tone by IUPC:         Laboratory Results:     Lab Results   Component Value Date    SPECGRAVUR 1.025 10/10/2019    KETONESU Negative 10/10/2019    BLOODU Negative 10/10/2019    LEUKOCYTESUR Negative 10/10/2019    NITRITEU Negative 10/10/2019             Assessment:  1.  Intrauterine pregnancy at 35w6d weeks gestation with reactive fetal status.    2.  False labour not ruptured    Plan:  1. FHTs  Reactive NST  2. No cervical change or signs and symptoms of SROM or labour  3. Reviewed labour guidelines  4. All questions have been answered.  5. Discharge home with routine OB follow-up      Leonid Harrison MD  10/11/2019  2:12 AM

## 2019-10-11 NOTE — PROGRESS NOTES
Laborist    Sarah is a 39-year-old -1-0-2 at 35 weeks 6 days sent from office today for prolonged monitoring.  There was a questionable deceleration during the NST.  She denies any complaints reports normal fetal activity.  Vital signs stable afebrile  NST- indications AMA, gestational diabetes mellitus A2, polyhydramnios, LGA, and tobacco abuse    Interpretation reactive, moderate variability, accelerations present 15 x 15, no decelerations noted, occasional contraction.  Onset 1344 and time 1640    PLAN  1.  Discharged home, labor instructions given, kick count, continue home medications, follow-up Dr. Seymour Monday for NST and continued obstetrical care.  All questions answered patient agreed with plan

## 2019-10-14 ENCOUNTER — ROUTINE PRENATAL (OUTPATIENT)
Dept: OBSTETRICS AND GYNECOLOGY | Facility: CLINIC | Age: 39
End: 2019-10-14

## 2019-10-14 VITALS — DIASTOLIC BLOOD PRESSURE: 60 MMHG | WEIGHT: 186.8 LBS | BODY MASS INDEX: 34.17 KG/M2 | SYSTOLIC BLOOD PRESSURE: 120 MMHG

## 2019-10-14 DIAGNOSIS — O09.523 ELDERLY MULTIGRAVIDA IN THIRD TRIMESTER: ICD-10-CM

## 2019-10-14 DIAGNOSIS — Z67.91 RH NEGATIVE STATE IN ANTEPARTUM PERIOD: ICD-10-CM

## 2019-10-14 DIAGNOSIS — O26.899 RH NEGATIVE STATE IN ANTEPARTUM PERIOD: ICD-10-CM

## 2019-10-14 DIAGNOSIS — O09.293 HISTORY OF PRE-ECLAMPSIA IN PRIOR PREGNANCY, CURRENTLY PREGNANT IN THIRD TRIMESTER: ICD-10-CM

## 2019-10-14 DIAGNOSIS — O24.414 INSULIN CONTROLLED GESTATIONAL DIABETES MELLITUS (GDM) IN THIRD TRIMESTER: ICD-10-CM

## 2019-10-14 PROCEDURE — 99213 OFFICE O/P EST LOW 20 MIN: CPT | Performed by: OBSTETRICS & GYNECOLOGY

## 2019-10-14 NOTE — PROGRESS NOTES
Chief Complaint   Patient presents with   • Routine Prenatal Visit     NST @954; c/o cx, pelvic pressure       HPI: Sarah is a  currently at 36w2d who today reports the following:  Contractions - YES - but less than 4/hour AND no associated change in vaginal discharge; Leaking - No; Vaginal bleeding -  No; Swelling of extremities - No.    ROS:  GI: Nausea - No; Constipation - No; Diarrhea - No    Neuro: Headache - No; Visual change - No      EXAM:  Vitals: See prenatal flowsheet   Abdomen: See prenatal flowsheet   Urine glucose/protein: See prenatal flowsheet   Pelvic: See prenatal flowsheet   MDM:   Impression: 1. Supervision of high risk pregnancy  2. A2GDM -- on Admelog 6 units with breakfast lunch and dinner  3. AMA  4. Polyhydramnios -- resolved on US today  3. Tobacco use in pregnancy  4. Large for Gestational Age  5. Low Lying Placenta -- resolved  6. Rh negative -- s/p Rhogam  7. History of Pre-eclampsia with G1   8. History of Trichomonas this pregnancy (MAGED on )   Tests done today: 1. BPP -   2. NST - equivocal  3. US: cephalic, 17.8 cm   Topics discussed: 1. Continue with PNV's  2. Prenatal labs reviewed  3. Patient has appointment with PDC on Wednesday  4. labor signs and symptoms  5. symptoms of preeclampsia  6. fetal kick count instructions   Tests scheduled today for her next visit:   NST

## 2019-10-16 ENCOUNTER — OFFICE VISIT (OUTPATIENT)
Dept: OBSTETRICS AND GYNECOLOGY | Facility: HOSPITAL | Age: 39
End: 2019-10-16

## 2019-10-16 ENCOUNTER — HOSPITAL ENCOUNTER (OUTPATIENT)
Facility: HOSPITAL | Age: 39
Setting detail: OBSERVATION
Discharge: HOME | End: 2019-10-16
Attending: OBSTETRICS & GYNECOLOGY | Admitting: OBSTETRICS & GYNECOLOGY

## 2019-10-16 ENCOUNTER — HOSPITAL ENCOUNTER (OUTPATIENT)
Dept: WOMENS IMAGING | Facility: HOSPITAL | Age: 39
Discharge: HOME | End: 2019-10-16
Admitting: OBSTETRICS & GYNECOLOGY

## 2019-10-16 VITALS
RESPIRATION RATE: 16 BRPM | HEART RATE: 90 BPM | SYSTOLIC BLOOD PRESSURE: 107 MMHG | TEMPERATURE: 97.9 F | DIASTOLIC BLOOD PRESSURE: 66 MMHG

## 2019-10-16 VITALS — DIASTOLIC BLOOD PRESSURE: 67 MMHG | WEIGHT: 187 LBS | SYSTOLIC BLOOD PRESSURE: 124 MMHG | BODY MASS INDEX: 34.2 KG/M2

## 2019-10-16 DIAGNOSIS — Z3A.32 32 WEEKS GESTATION OF PREGNANCY: ICD-10-CM

## 2019-10-16 DIAGNOSIS — O09.293 HISTORY OF PRE-ECLAMPSIA IN PRIOR PREGNANCY, CURRENTLY PREGNANT IN THIRD TRIMESTER: ICD-10-CM

## 2019-10-16 DIAGNOSIS — O24.414 INSULIN CONTROLLED GESTATIONAL DIABETES MELLITUS (GDM) IN THIRD TRIMESTER: Primary | ICD-10-CM

## 2019-10-16 DIAGNOSIS — O24.410 DIET CONTROLLED GESTATIONAL DIABETES MELLITUS (GDM) IN THIRD TRIMESTER: ICD-10-CM

## 2019-10-16 DIAGNOSIS — O09.523 ELDERLY MULTIGRAVIDA IN THIRD TRIMESTER: ICD-10-CM

## 2019-10-16 DIAGNOSIS — O40.3XX0 POLYHYDRAMNIOS IN THIRD TRIMESTER COMPLICATION, SINGLE OR UNSPECIFIED FETUS: ICD-10-CM

## 2019-10-16 PROBLEM — O36.8130 DECREASED FETUS MOVEMENTS AFFECTING MANAGEMENT OF MOTHER IN THIRD TRIMESTER: Status: ACTIVE | Noted: 2019-10-16

## 2019-10-16 PROCEDURE — 76819 FETAL BIOPHYS PROFIL W/O NST: CPT | Performed by: OBSTETRICS & GYNECOLOGY

## 2019-10-16 PROCEDURE — 76816 OB US FOLLOW-UP PER FETUS: CPT

## 2019-10-16 PROCEDURE — 59025 FETAL NON-STRESS TEST: CPT

## 2019-10-16 PROCEDURE — 76816 OB US FOLLOW-UP PER FETUS: CPT | Performed by: OBSTETRICS & GYNECOLOGY

## 2019-10-16 PROCEDURE — 76819 FETAL BIOPHYS PROFIL W/O NST: CPT

## 2019-10-16 PROCEDURE — G0378 HOSPITAL OBSERVATION PER HR: HCPCS

## 2019-10-16 RX ORDER — MEDICAL SUPPLY, MISCELLANEOUS
EACH MISCELLANEOUS
Refills: 6 | COMMUNITY
Start: 2019-10-01 | End: 2019-10-16 | Stop reason: SDUPTHER

## 2019-10-16 NOTE — H&P
Sarah Goel  1980  [id number]  [id number]    CC: decreased fetal movement  HPI:  Patient is 39 y.o. female   currently at 36w4d presents with c/o decreased fetal movement.  Normal movment through 1500 yesterday.  Since then, some movement, but less vigorous and frequent.  Pt denies vag bleeding, SROM.  Pt having some painless contractions.  Baby is moving better now.  PNC comp by AMA, obesity, IDDM.     PMH:  Current meds: PNV, insulin, baby ASA, clonepin prn  Illnesses: none  Surgeries: breast bx (benign), lap farhad  Allergies: NKDA    Past OB History:       Obstetric History       T1      L2     SAB0   TAB0   Ectopic0   Molar0   Multiple0   Live Births2       # Outcome Date GA Lbr David/2nd Weight Sex Delivery Anes PTL Lv   3 Current            2 Term 07 38w0d  2722 g (6 lb) F Vag-Spont  Y RED      Name: Ember   1  01 36w0d  3033 g (6 lb 11 oz) F Vag-Forceps  N RED      Name: Leonor      Complications: Pre-eclampsia,Status post induction of labor      Obstetric Comments   1- Leonor (FOB #1)   2- Ember (FOB #2)   3- FOB #3            SH: tob 1PPD , EtOH neg, drugs neg  FH: heart dz neg , diabetes pos , cancer neg    General ROS: decreased fetal movement.   All other systems reviewed and are negative.      Physical Examination: General appearance - alert, well appearing, and in no distress  Vital signs - /66   Pulse 90   Temp 97.9 °F (36.6 °C)   Resp 16   LMP 2019 (Exact Date)   HEENT: normocephalic, atraumatic,oropharynx clear, appearance of ears and nose normal  Neck - supple, no significant adenopathy, no thyromegaly  Lymphatics - no palpable lymphadenopathy in the neck or groin, no hepatosplenomegaly  Chest - clear to auscultation, no wheezes, rales or rhonchi, respiratory effort non-labored  Heart - normal rate, regular rhythm, no murmurs, rubs, clicks or gallops, no JVD, no lower extremity edema  Abdomen - soft, nontender, nondistended, no masses, no  hepatosplenomegaly  no rebound tenderness noted, bowel sounds normal  Vaginal Exam: deferred  Extremities - no pedal edema noted, no calf tend  Skin -warm and dry, normal coloration and turgor, no rashes, no suspicious skin lesions noted      Fetal monitoring: indication decreased fetal movement , onset 0456 , offset 0540 , baseline 135-140 , mod BTB variability , multiple accels (15 X 15), no decels, irreg contractions, interpretation reactive NST    Radiology     Assessment 1)IUP 36 4/7 weeks   2)decreased fetal movement- reassuring NST, better movment now   3)adv mat age   4)IDDM   5)obesity   6)tobacco use   7)?LGA   8)trich this pregnancy    Plan 1)observe       2)home     3)keep PDC appt later today    Nick Brown MD  10/16/2019  5:42 AM

## 2019-10-16 NOTE — PROGRESS NOTES
"Pt denies all s/s PTL.  Reports \"went to LD this am for decreased movement but baby was fine.\"  Seeing OB twice weekly with next appt 10/18/19.  Sonia managing GDM with next appt \"in January 2020\" but emailing glucose logs every Sunday.\"   "

## 2019-10-18 ENCOUNTER — ROUTINE PRENATAL (OUTPATIENT)
Dept: OBSTETRICS AND GYNECOLOGY | Facility: CLINIC | Age: 39
End: 2019-10-18

## 2019-10-18 VITALS — BODY MASS INDEX: 34.31 KG/M2 | WEIGHT: 187.6 LBS | SYSTOLIC BLOOD PRESSURE: 128 MMHG | DIASTOLIC BLOOD PRESSURE: 60 MMHG

## 2019-10-18 DIAGNOSIS — O40.3XX0 POLYHYDRAMNIOS IN THIRD TRIMESTER COMPLICATION, SINGLE OR UNSPECIFIED FETUS: ICD-10-CM

## 2019-10-18 DIAGNOSIS — O26.899 RH NEGATIVE STATE IN ANTEPARTUM PERIOD: ICD-10-CM

## 2019-10-18 DIAGNOSIS — O09.293 HISTORY OF PRE-ECLAMPSIA IN PRIOR PREGNANCY, CURRENTLY PREGNANT IN THIRD TRIMESTER: ICD-10-CM

## 2019-10-18 DIAGNOSIS — O09.523 ELDERLY MULTIGRAVIDA IN THIRD TRIMESTER: ICD-10-CM

## 2019-10-18 DIAGNOSIS — O24.414 INSULIN CONTROLLED GESTATIONAL DIABETES MELLITUS (GDM) IN THIRD TRIMESTER: ICD-10-CM

## 2019-10-18 DIAGNOSIS — Z67.91 RH NEGATIVE STATE IN ANTEPARTUM PERIOD: ICD-10-CM

## 2019-10-18 PROCEDURE — 59025 FETAL NON-STRESS TEST: CPT | Performed by: OBSTETRICS & GYNECOLOGY

## 2019-10-18 PROCEDURE — 99213 OFFICE O/P EST LOW 20 MIN: CPT | Performed by: OBSTETRICS & GYNECOLOGY

## 2019-10-18 NOTE — PROGRESS NOTES
Chief Complaint   Patient presents with   • Routine Prenatal Visit     NST today @1339; no c/o       HPI: Sarah is a  currently at 36w6d who today reports the following:  Contractions - No; Leaking - No; Vaginal bleeding -  No; Swelling of extremities - No.    ROS:  GI: Nausea - No; Constipation - No; Diarrhea - No    Neuro: Headache - No; Visual change - No      EXAM:  Vitals: See prenatal flowsheet   Abdomen: See prenatal flowsheet   Urine glucose/protein: See prenatal flowsheet   Pelvic: See prenatal flowsheet   MDM:   Impression: 1. Supervision of high risk pregnancy  2. A2GDM -- on Admelog 8 units with breakfast lunch and dinner  3. AMA  4. History of Polyhydramnios   3. Tobacco use in pregnancy  4. Large for Gestational Age  5. Low Lying Placenta -- resolved  6. Rh negative -- s/p Rhogam  7. History of Pre-eclampsia with G1   8. History of Trichomonas this pregnancy (MAGED on )  9. GBS positive   Tests done today: 1. NST - reactive   Topics discussed: 1. Continue with PNV's  2. Prenatal labs reviewed  3. PDC ultrasound report reviewed.  4. Will continue twice weekly testing.  5. Patient has elected to have primary LTCS secondary to LGA with AC >99th percentile. Will schedule today.   6. labor signs and symptoms  7. symptoms of preeclampsia  8. fetal kick count instructions   Tests scheduled today for her next visit:   KASSY

## 2019-10-21 ENCOUNTER — ROUTINE PRENATAL (OUTPATIENT)
Dept: OBSTETRICS AND GYNECOLOGY | Facility: CLINIC | Age: 39
End: 2019-10-21

## 2019-10-21 VITALS — WEIGHT: 187 LBS | SYSTOLIC BLOOD PRESSURE: 108 MMHG | DIASTOLIC BLOOD PRESSURE: 70 MMHG | BODY MASS INDEX: 34.2 KG/M2

## 2019-10-21 DIAGNOSIS — O24.414 INSULIN CONTROLLED GESTATIONAL DIABETES MELLITUS (GDM) IN THIRD TRIMESTER: ICD-10-CM

## 2019-10-21 DIAGNOSIS — O09.293 HISTORY OF PRE-ECLAMPSIA IN PRIOR PREGNANCY, CURRENTLY PREGNANT IN THIRD TRIMESTER: ICD-10-CM

## 2019-10-21 DIAGNOSIS — O09.523 ELDERLY MULTIGRAVIDA IN THIRD TRIMESTER: ICD-10-CM

## 2019-10-21 DIAGNOSIS — O26.899 RH NEGATIVE STATE IN ANTEPARTUM PERIOD: ICD-10-CM

## 2019-10-21 DIAGNOSIS — Z67.91 RH NEGATIVE STATE IN ANTEPARTUM PERIOD: ICD-10-CM

## 2019-10-21 DIAGNOSIS — O40.3XX0 POLYHYDRAMNIOS IN THIRD TRIMESTER COMPLICATION, SINGLE OR UNSPECIFIED FETUS: ICD-10-CM

## 2019-10-21 PROCEDURE — 99213 OFFICE O/P EST LOW 20 MIN: CPT | Performed by: OBSTETRICS & GYNECOLOGY

## 2019-10-21 NOTE — PROGRESS NOTES
Chief Complaint   Patient presents with   • Routine Prenatal Visit     U/S before appt; c/o cx and pelvic pressure       HPI: Sarah is a  currently at 37w2d who today reports the following:  Contractions - YES - but less than 4/hour AND no associated change in vaginal discharge; Leaking - No; Vaginal bleeding -  No; Swelling of extremities - No.    ROS:  GI: Nausea - No; Constipation - No; Diarrhea - No    Neuro: Headache - No; Visual change - No      EXAM:  Vitals: See prenatal flowsheet   Abdomen: See prenatal flowsheet   Urine glucose/protein: See prenatal flowsheet   Pelvic: See prenatal flowsheet   MDM:   Impression: 1. Supervision of high risk pregnancy  2. A2GDM -- on Admelog 8 units with breakfast lunch and dinner  3. AMA  4. Polyhydramnios   3. Tobacco use in pregnancy  4. Large for Gestational Age  5. Low Lying Placenta -- resolved  6. Rh negative -- s/p Rhogam  7. History of Pre-eclampsia with G1   8. History of Trichomonas this pregnancy (MAGED on )  9. GBS positive  10. Desires elective  -- scheduled    Tests done today: 1. BPP -   2. ANNA: 20 cm with MVP 8.7 cm   Topics discussed: 1. Continue with PNV's  2. Prenatal labs reviewed  3. labor signs and symptoms  4. symptoms of preeclampsia  5. fetal kick count instructions   Tests scheduled today for her next visit:   NST

## 2019-10-24 ENCOUNTER — ROUTINE PRENATAL (OUTPATIENT)
Dept: OBSTETRICS AND GYNECOLOGY | Facility: CLINIC | Age: 39
End: 2019-10-24

## 2019-10-24 VITALS — SYSTOLIC BLOOD PRESSURE: 108 MMHG | BODY MASS INDEX: 34.24 KG/M2 | DIASTOLIC BLOOD PRESSURE: 70 MMHG | WEIGHT: 187.2 LBS

## 2019-10-24 DIAGNOSIS — O40.3XX0 POLYHYDRAMNIOS IN THIRD TRIMESTER COMPLICATION, SINGLE OR UNSPECIFIED FETUS: ICD-10-CM

## 2019-10-24 DIAGNOSIS — O09.293 HISTORY OF PRE-ECLAMPSIA IN PRIOR PREGNANCY, CURRENTLY PREGNANT IN THIRD TRIMESTER: ICD-10-CM

## 2019-10-24 DIAGNOSIS — O24.414 INSULIN CONTROLLED GESTATIONAL DIABETES MELLITUS (GDM) IN THIRD TRIMESTER: ICD-10-CM

## 2019-10-24 DIAGNOSIS — Z67.91 RH NEGATIVE STATE IN ANTEPARTUM PERIOD: ICD-10-CM

## 2019-10-24 DIAGNOSIS — O09.523 ELDERLY MULTIGRAVIDA IN THIRD TRIMESTER: ICD-10-CM

## 2019-10-24 DIAGNOSIS — O26.899 RH NEGATIVE STATE IN ANTEPARTUM PERIOD: ICD-10-CM

## 2019-10-24 PROCEDURE — 99213 OFFICE O/P EST LOW 20 MIN: CPT | Performed by: OBSTETRICS & GYNECOLOGY

## 2019-10-24 PROCEDURE — 59025 FETAL NON-STRESS TEST: CPT | Performed by: OBSTETRICS & GYNECOLOGY

## 2019-10-24 NOTE — PROGRESS NOTES
Chief Complaint   Patient presents with   • Routine Prenatal Visit     NST @ 954; c/o pelvic pressure       HPI: Sarah is a  currently at 37w5d who today reports the following:  Contractions - No; Leaking - No; Vaginal bleeding -  No; Swelling of extremities - No.    ROS:  GI: Nausea - No; Constipation - No; Diarrhea - No    Neuro: Headache - No; Visual change - No      EXAM:  Vitals: See prenatal flowsheet   Abdomen: See prenatal flowsheet   Urine glucose/protein: See prenatal flowsheet   Pelvic: See prenatal flowsheet   MDM:   Impression: 1. Supervision of high risk pregnancy  2. A2GDM -- on Admelog 8 units with breakfast lunch and dinner  3. AMA  4. Polyhydramnios   3. Tobacco use in pregnancy  4. Large for Gestational Age  5. Low Lying Placenta -- resolved  6. Rh negative -- s/p Rhogam  7. History of Pre-eclampsia with G1   8. History of Trichomonas this pregnancy (MAGED on )  9. GBS positive  10. Desires elective  -- scheduled    Tests done today: 1. NST - reactive   Topics discussed: 1. Continue with PNV's  2. Prenatal labs reviewed  3. labor signs and symptoms  4. symptoms of preeclampsia  5. fetal kick count instructions   Tests scheduled today for her next visit:   BPP

## 2019-10-25 ENCOUNTER — HOSPITAL ENCOUNTER (OUTPATIENT)
Facility: HOSPITAL | Age: 39
Setting detail: OBSERVATION
Discharge: HOME | End: 2019-10-25
Attending: OBSTETRICS & GYNECOLOGY | Admitting: OBSTETRICS & GYNECOLOGY

## 2019-10-25 ENCOUNTER — TELEPHONE (OUTPATIENT)
Dept: OBSTETRICS AND GYNECOLOGY | Facility: CLINIC | Age: 39
End: 2019-10-25

## 2019-10-25 VITALS
TEMPERATURE: 97.9 F | RESPIRATION RATE: 18 BRPM | WEIGHT: 187 LBS | DIASTOLIC BLOOD PRESSURE: 74 MMHG | BODY MASS INDEX: 34.41 KG/M2 | HEART RATE: 96 BPM | SYSTOLIC BLOOD PRESSURE: 127 MMHG | HEIGHT: 62 IN

## 2019-10-25 LAB — GLUCOSE BLDC GLUCOMTR-MCNC: 99 MG/DL (ref 70–130)

## 2019-10-25 PROCEDURE — 84112 EVAL AMNIOTIC FLUID PROTEIN: CPT | Performed by: OBSTETRICS & GYNECOLOGY

## 2019-10-25 PROCEDURE — 59025 FETAL NON-STRESS TEST: CPT

## 2019-10-25 PROCEDURE — 99218 PR INITIAL OBSERVATION CARE/DAY 30 MINUTES: CPT | Performed by: OBSTETRICS & GYNECOLOGY

## 2019-10-25 PROCEDURE — 82962 GLUCOSE BLOOD TEST: CPT

## 2019-10-25 PROCEDURE — 59025 FETAL NON-STRESS TEST: CPT | Performed by: OBSTETRICS & GYNECOLOGY

## 2019-10-25 PROCEDURE — G0378 HOSPITAL OBSERVATION PER HR: HCPCS

## 2019-10-25 RX ORDER — DIPHENHYDRAMINE HCL 25 MG
25 CAPSULE ORAL EVERY 6 HOURS PRN
COMMUNITY
End: 2019-11-01 | Stop reason: HOSPADM

## 2019-10-25 NOTE — H&P
\[facility]  Obstetric History and Physical    Referring Provider: Eliza Seymour DO      Chief Complaint   Patient presents with   • Leaking Fluid       Subjective     Patient is a 39 y.o. female  currently at 37w6d, who presents with C/O leaking of fluid.  She reports increasing vaginal discharge last night continued throughout the day without associated vaginal bleeding or increased uterine activity.  Patient reports normal fetal activity.  Prenatal care by Dr. Seymour.  Prenatal care complicated complicated by AMA, gestational diabetes mellitus A2, polyhydramnios, tobacco use, large gestational age plan for primary  delivery at 39 weeks gestation.    Her prenatal care is complicated by  diabetes  GDM A2.  Her previous obstetric/gynecological history is noted for is remarkable for .    The following portions of the patients history were reviewed and updated as appropriate: current medications, allergies, past medical history, past surgical history, past family history, past social history and problem list .       Prenatal Information:   Maternal Prenatal Labs  Blood Type No results found for: ABO   Rh Status No results found for: RH   Antibody Screen No results found for: ABSCRN   Gonnorhea No results found for: GCCX   Chlamydia No results found for: CLAMYDCU   RPR No results found for: RPR   Syphilis Antibody No results found for: SYPHILIS   Rubella No results found for: RUBELLAIGGIN   Hepatitis B Surface Antigen No results found for: HEPBSAG   HIV-1 Antibody No results found for: LABHIV1   Hepatitis C Antibody No results found for: HEPCAB   Rapid Urin Drug Screen No results found for: AMPMETHU, BARBITSCNUR, LABBENZSCN, LABMETHSCN, LABOPIASCN, THCURSCR, COCAINEUR, AMPHETSCREEN, PROPOXSCN, BUPRENORSCNU, METAMPSCNUR, OXYCODONESCN, TRICYCLICSCN   Group B Strep Culture No results found for: GBSANTIGEN           External Prenatal Results     Pregnancy Outside Results - Transcribed From Office  Records - See Scanned Records For Details     Test Value Date Time    Hgb 11.5 g/dL 19 0908    Hct 35.1 % 19 0908    ABO A  19 1114    Rh Negative  19 1114    Antibody Screen Negative  19 0908    Glucose Fasting GTT       Glucose Tolerance Test 1 hour       Glucose Tolerance Test 3 hour       Gonorrhea (discrete)       Chlamydia (discrete)       RPR Non-Reactive  19 1114    VDRL       Syphilis Antibody       Rubella 98.5 IU/mL 19 1114      Immune  19 1114    HBsAg Non-Reactive  19 1114    Herpes Simplex Virus PCR       Herpes Simplex VIrus Culture       HIV Non-Reactive  19 1114    Hep C RNA Quant PCR       Hep C Antibody Non-Reactive  19 1114    AFP       Group B Strep positive  10/11/19     GBS Susceptibility to Clindamycin       GBS Susceptibility to Erythromycin       Fetal Fibronectin       Genetic Testing, Maternal Blood             Drug Screening     Test Value Date Time    Urine Drug Screen       Amphetamine Screen Negative  19 1114    Barbiturate Screen Negative  19 1114    Benzodiazepine Screen Negative  19 1114    Methadone Screen Negative  19 1114    Phencyclidine Screen Negative  19 1114    Opiates Screen Negative  19 1114    THC Screen Negative  19 1114    Cocaine Screen       Propoxyphene Screen Negative  19 1114    Buprenorphine Screen Negative  19 1114    Methamphetamine Screen       Oxycodone Screen Negative  19 1114    Tricyclic Antidepressants Screen Negative  19 1114                  Past OB History:       Obstetric History       T1      L2     SAB0   TAB0   Ectopic0   Molar0   Multiple0   Live Births2       # Outcome Date GA Lbr David/2nd Weight Sex Delivery Anes PTL Lv   3 Current            2 Term 07 38w0d  2722 g (6 lb) F Vag-Spont  Y RED      Name: Ember   1  01 36w0d  3033 g (6 lb 11 oz) F Vag-Forceps  N RED      Name: Leonor       Complications: Pre-eclampsia,Status post induction of labor      Obstetric Comments   1- Leonor (FOB #1)   2- Ember (FOB #2)   3- FOB #3       Past Medical History: Past Medical History:   Diagnosis Date   • Anxiety 2017   • Diabetes mellitus (CMS/HCC)     GDM 2019   • Gestational diabetes    • History of pre-eclampsia 2001   • Trichomonas infection 05/2019      Past Surgical History Past Surgical History:   Procedure Laterality Date   • BREAST EXCISIONAL BIOPSY Right 1998    WNL   • LAPAROSCOPIC CHOLECYSTECTOMY  2014      Family History: Family History   Problem Relation Age of Onset   • Arthritis Mother    • Thyroid disease Mother    • Thyroid disease Sister    • Diabetes Maternal Grandmother    • Hypertension Maternal Grandmother    • Breast cancer Neg Hx    • Ovarian cancer Neg Hx    • Uterine cancer Neg Hx    • Endometrial cancer Neg Hx    • Colon cancer Neg Hx       Social History:  reports that she has been smoking cigarettes.  She started smoking about 26 years ago. She has been smoking about 1.00 pack per day. She has never used smokeless tobacco.   reports that she does not drink alcohol.   reports that she does not use drugs.                   General ROS Negative Findings:Headaches, Visual Changes, Epigastric pain, Anorexia, Nausia/Vomiting and Vaginal Bleeding    ROS     All other systems have been reviewed and are neg  Objective       Vital Signs Range for the last 24 hours  Temperature: Temp:  [97.9 °F (36.6 °C)] 97.9 °F (36.6 °C)   Temp Source: Temp src: Oral   BP: BP: (127)/(74) 127/74   Pulse: Heart Rate:  [96] 96   Respirations: Resp:  [18] 18   SPO2:     O2 Amount (l/min):     O2 Devices     Weight: Weight:  [84.8 kg (187 lb)] 84.8 kg (187 lb)     Physical Examination:   General:   alert, appears stated age and cooperative   Skin:   normal   HEENT:  Sclera clear   Lungs:   clear to auscultation bilaterally   Heart:   regular rate and rhythm, S1, S2 normal, no murmur, click, rub or gallop    Abdomen:  Soft, gravid uterus, no guarding, rebound,   Lower Extremities  1+ edema, no calf tenderness   Pelvis:  External genitalia: normal general appearance  Uterus: enlarged         Presentation: vtx   Cervix: Exam by: Method: sterile exam per physician   Dilation:  2cm   Effacement:  60   Station:  -3                                           Negative AmniSure    Fetal Heart Rate Assessment   Method:     Beats/min:     Baseline:     Varibility:     Accels:     Decels:     Tracing Category:     NST-indications rupture membranes interpretation reactive, moderate variability, accelerations present 15 x 15, no decelerations noted onset 1720 and time 1809, occasional contraction  Uterine Assessment   Method: Method: palpation   Frequency (min):     Ctx Count in 10 min:     Duration:     Intensity: Contraction Intensity: moderate by palpation   Intensity by IUPC:     Resting Tone: Uterine Resting Tone: soft by palpation   Resting Tone by IUPC:     [place] Units:       Laboratory Results:   Lab Results (last 24 hours)     Procedure Component Value Units Date/Time    POC Glucose Once [[id number]]  (Normal) Collected:  10/25/19 1820    Specimen:  Blood Updated:  10/25/19 1822     Glucose 99 mg/dL         Radiology Review:   Imaging Results (last 24 hours)     ** No results found for the last 24 hours. **        Other Studies:    Assessment/Plan       Elderly multigravida in third trimester    Tobacco use in pregnancy    History of pre-eclampsia in prior pregnancy, currently pregnant in third trimester    Rh (-)    Anxiety    Pregnancy    Insulin controlled gestational diabetes mellitus (GDM) in third trimester    Excessive fetal growth affecting management of pregnancy in third trimester    Polyhydramnios in third trimester        Assessment:  1.  Intrauterine pregnancy at 37w6d weeks gestation with reactive fetal status.    2.  No evidence of labor rupture membranes (negative AmniSure)  3. GDMA2-   4.  AMA  5.   Polyhydramnios  6.  LGA plan for primary  section at 39 weeks gestation    Plan:  1. D/C to home, continue home medications, labor instructions given, kick count given, follow-up Dr. Seymour on Monday for routine  visit and testing.  2. Plan of care has been reviewed with patient.  3.  Risks, benefits of treatment plan have been discussed.  4.  All questions have been answered.  5  D/W Lion Rosenberg DO  10/25/2019  6:22 PM

## 2019-10-25 NOTE — TELEPHONE ENCOUNTER
Dr. Seymour patient 37w6d  [phone] patient called stating yesterday she felt wet most of the day and today she's having a watery vaginal discharge bu not to the point where she has to change her underwear. Patient denies a gush of fluid. +FM. I advised patient I will run this by Dr. Seymour and someone from our office will give her a call. Patient verbalized understanding.

## 2019-10-28 ENCOUNTER — ROUTINE PRENATAL (OUTPATIENT)
Dept: OBSTETRICS AND GYNECOLOGY | Facility: CLINIC | Age: 39
End: 2019-10-28

## 2019-10-28 VITALS — SYSTOLIC BLOOD PRESSURE: 132 MMHG | WEIGHT: 186.8 LBS | BODY MASS INDEX: 34.17 KG/M2 | DIASTOLIC BLOOD PRESSURE: 74 MMHG

## 2019-10-28 DIAGNOSIS — O26.899 RH NEGATIVE STATE IN ANTEPARTUM PERIOD: ICD-10-CM

## 2019-10-28 DIAGNOSIS — O36.63X0 EXCESSIVE FETAL GROWTH AFFECTING MANAGEMENT OF PREGNANCY IN THIRD TRIMESTER, SINGLE OR UNSPECIFIED FETUS: ICD-10-CM

## 2019-10-28 DIAGNOSIS — O09.523 ELDERLY MULTIGRAVIDA IN THIRD TRIMESTER: ICD-10-CM

## 2019-10-28 DIAGNOSIS — O40.3XX0 POLYHYDRAMNIOS IN THIRD TRIMESTER COMPLICATION, SINGLE OR UNSPECIFIED FETUS: ICD-10-CM

## 2019-10-28 DIAGNOSIS — O24.414 INSULIN CONTROLLED GESTATIONAL DIABETES MELLITUS (GDM) IN THIRD TRIMESTER: ICD-10-CM

## 2019-10-28 DIAGNOSIS — Z67.91 RH NEGATIVE STATE IN ANTEPARTUM PERIOD: ICD-10-CM

## 2019-10-28 DIAGNOSIS — O09.293 HISTORY OF PRE-ECLAMPSIA IN PRIOR PREGNANCY, CURRENTLY PREGNANT IN THIRD TRIMESTER: ICD-10-CM

## 2019-10-28 LAB — POC AMNISURE: NEGATIVE

## 2019-10-28 PROCEDURE — 84112 EVAL AMNIOTIC FLUID PROTEIN: CPT | Performed by: OBSTETRICS & GYNECOLOGY

## 2019-10-28 PROCEDURE — 99212-NC PR NO CHARGE CBC OFFICE OUTPATIENT VISIT 10 MINUTES: Performed by: OBSTETRICS & GYNECOLOGY

## 2019-10-28 NOTE — PROGRESS NOTES
Chief Complaint   Patient presents with   • Routine Prenatal Visit     U/S before; c/o pelvic pressure and cx       HPI: Sarah is a  currently at 38w2d who today reports the following:  Contractions - YES - but less than 4/hour AND no associated change in vaginal discharge; Leaking - No; Vaginal bleeding -  No; Swelling of extremities - No.    ROS:  GI: Nausea - No; Constipation - No; Diarrhea - No    Neuro: Headache - No; Visual change - No      EXAM:  Vitals: See prenatal flowsheet   Abdomen: See prenatal flowsheet   Urine glucose/protein: See prenatal flowsheet   Pelvic: See prenatal flowsheet   MDM:   Impression: 1. Supervision of high risk pregnancy  2. A2GDM -- on Admelog 8 units with breakfast lunch and dinner  3. AMA  4. Polyhydramnios   3. Tobacco use in pregnancy  4. Large for Gestational Age  5. Low Lying Placenta -- resolved  6. Rh negative -- s/p Rhogam  7. History of Pre-eclampsia with G1   8. History of Trichomonas this pregnancy (MAGED on )  9. GBS positive  10. Desires elective  -- scheduled    Tests done today: 1. BPP -    Topics discussed: 1. Continue with PNV's  2. Prenatal labs reviewed  3. labor signs and symptoms  4. symptoms of preeclampsia  5. fetal kick count instructions   Tests scheduled today for her next visit:   FRAN

## 2019-10-29 ENCOUNTER — ANESTHESIA EVENT (OUTPATIENT)
Dept: LABOR AND DELIVERY | Facility: HOSPITAL | Age: 39
End: 2019-10-29

## 2019-10-29 ENCOUNTER — HOSPITAL ENCOUNTER (INPATIENT)
Facility: HOSPITAL | Age: 39
LOS: 3 days | Discharge: HOME | End: 2019-11-01
Attending: OBSTETRICS & GYNECOLOGY | Admitting: OBSTETRICS & GYNECOLOGY

## 2019-10-29 ENCOUNTER — ANESTHESIA (OUTPATIENT)
Dept: LABOR AND DELIVERY | Facility: HOSPITAL | Age: 39
End: 2019-10-29

## 2019-10-29 PROBLEM — Z37.9 NORMAL LABOR: Status: ACTIVE | Noted: 2019-10-29

## 2019-10-29 PROBLEM — O99.330 TOBACCO USE IN PREGNANCY: Status: RESOLVED | Noted: 2019-03-28 | Resolved: 2019-10-29

## 2019-10-29 PROBLEM — O36.63X0 EXCESSIVE FETAL GROWTH AFFECTING MANAGEMENT OF PREGNANCY IN THIRD TRIMESTER: Status: RESOLVED | Noted: 2019-09-23 | Resolved: 2019-10-29

## 2019-10-29 PROBLEM — O09.523 ELDERLY MULTIGRAVIDA IN THIRD TRIMESTER: Status: RESOLVED | Noted: 2019-03-28 | Resolved: 2019-10-29

## 2019-10-29 PROBLEM — O40.3XX0 POLYHYDRAMNIOS IN THIRD TRIMESTER: Status: RESOLVED | Noted: 2019-10-08 | Resolved: 2019-10-29

## 2019-10-29 PROBLEM — O24.414 INSULIN CONTROLLED GESTATIONAL DIABETES MELLITUS (GDM) IN THIRD TRIMESTER: Status: RESOLVED | Noted: 2019-08-27 | Resolved: 2019-10-29

## 2019-10-29 PROBLEM — O09.293 HISTORY OF PRE-ECLAMPSIA IN PRIOR PREGNANCY, CURRENTLY PREGNANT IN THIRD TRIMESTER: Status: RESOLVED | Noted: 2019-03-28 | Resolved: 2019-10-29

## 2019-10-29 LAB
ABO GROUP BLD: NORMAL
ALP SERPL-CCNC: 138 U/L (ref 39–117)
ALT SERPL W P-5'-P-CCNC: 9 U/L (ref 1–33)
ANTI-D, PASSIVE: NORMAL
ANTIBODY TO LOW FREQUENCY ANTIGEN: NORMAL
AST SERPL-CCNC: 15 U/L (ref 1–32)
BILIRUB SERPL-MCNC: 0.2 MG/DL (ref 0.2–1.2)
BLD GP AB SCN SERPL QL: POSITIVE
CREAT BLD-MCNC: 0.33 MG/DL (ref 0.57–1)
DEPRECATED RDW RBC AUTO: 43.5 FL (ref 37–54)
ERYTHROCYTE [DISTWIDTH] IN BLOOD BY AUTOMATED COUNT: 13.5 % (ref 12.3–15.4)
GLUCOSE BLDC GLUCOMTR-MCNC: 88 MG/DL (ref 70–130)
HCT VFR BLD AUTO: 39.5 % (ref 34–46.6)
HGB BLD-MCNC: 13.3 G/DL (ref 12–15.9)
LDH SERPL-CCNC: 196 U/L (ref 135–214)
MCH RBC QN AUTO: 29.7 PG (ref 26.6–33)
MCHC RBC AUTO-ENTMCNC: 33.7 G/DL (ref 31.5–35.7)
MCV RBC AUTO: 88.2 FL (ref 79–97)
PLATELET # BLD AUTO: 256 10*3/MM3 (ref 140–450)
PMV BLD AUTO: 10.2 FL (ref 6–12)
RBC # BLD AUTO: 4.48 10*6/MM3 (ref 3.77–5.28)
RH BLD: NEGATIVE
T&S EXPIRATION DATE: NORMAL
URATE SERPL-MCNC: 4.4 MG/DL (ref 2.4–5.7)
WBC NRBC COR # BLD: 11.8 10*3/MM3 (ref 3.4–10.8)

## 2019-10-29 PROCEDURE — 86850 RBC ANTIBODY SCREEN: CPT | Performed by: OBSTETRICS & GYNECOLOGY

## 2019-10-29 PROCEDURE — 86870 RBC ANTIBODY IDENTIFICATION: CPT | Performed by: OBSTETRICS & GYNECOLOGY

## 2019-10-29 PROCEDURE — 25010000003 MORPHINE PER 10 MG: Performed by: ANESTHESIOLOGY

## 2019-10-29 PROCEDURE — 83615 LACTATE (LD) (LDH) ENZYME: CPT | Performed by: OBSTETRICS & GYNECOLOGY

## 2019-10-29 PROCEDURE — 86901 BLOOD TYPING SEROLOGIC RH(D): CPT | Performed by: OBSTETRICS & GYNECOLOGY

## 2019-10-29 PROCEDURE — 25010000002 TERBUTALINE PER 1 MG: Performed by: OBSTETRICS & GYNECOLOGY

## 2019-10-29 PROCEDURE — 0UC97ZZ EXTIRPATION OF MATTER FROM UTERUS, VIA NATURAL OR ARTIFICIAL OPENING: ICD-10-PCS | Performed by: OBSTETRICS & GYNECOLOGY

## 2019-10-29 PROCEDURE — 84460 ALANINE AMINO (ALT) (SGPT): CPT | Performed by: OBSTETRICS & GYNECOLOGY

## 2019-10-29 PROCEDURE — 25010000002 FENTANYL CITRATE (PF) 100 MCG/2ML SOLUTION: Performed by: ANESTHESIOLOGY

## 2019-10-29 PROCEDURE — 25010000003 CEFAZOLIN IN DEXTROSE 2-4 GM/100ML-% SOLUTION: Performed by: OBSTETRICS & GYNECOLOGY

## 2019-10-29 PROCEDURE — 25010000002 METOCLOPRAMIDE PER 10 MG: Performed by: ANESTHESIOLOGY

## 2019-10-29 PROCEDURE — 59514 CESAREAN DELIVERY ONLY: CPT | Performed by: OBSTETRICS & GYNECOLOGY

## 2019-10-29 PROCEDURE — 84075 ASSAY ALKALINE PHOSPHATASE: CPT | Performed by: OBSTETRICS & GYNECOLOGY

## 2019-10-29 PROCEDURE — 94640 AIRWAY INHALATION TREATMENT: CPT

## 2019-10-29 PROCEDURE — 25010000002 METHYLERGONOVINE MALEATE PER 0.2 MG: Performed by: OBSTETRICS & GYNECOLOGY

## 2019-10-29 PROCEDURE — 25010000002 ONDANSETRON PER 1 MG: Performed by: ANESTHESIOLOGY

## 2019-10-29 PROCEDURE — 84450 TRANSFERASE (AST) (SGOT): CPT | Performed by: OBSTETRICS & GYNECOLOGY

## 2019-10-29 PROCEDURE — 86900 BLOOD TYPING SEROLOGIC ABO: CPT | Performed by: OBSTETRICS & GYNECOLOGY

## 2019-10-29 PROCEDURE — 82962 GLUCOSE BLOOD TEST: CPT

## 2019-10-29 PROCEDURE — 85027 COMPLETE CBC AUTOMATED: CPT | Performed by: OBSTETRICS & GYNECOLOGY

## 2019-10-29 PROCEDURE — 59025 FETAL NON-STRESS TEST: CPT

## 2019-10-29 PROCEDURE — 82247 BILIRUBIN TOTAL: CPT | Performed by: OBSTETRICS & GYNECOLOGY

## 2019-10-29 PROCEDURE — 59515 CESAREAN DELIVERY: CPT | Performed by: OBSTETRICS & GYNECOLOGY

## 2019-10-29 PROCEDURE — 82565 ASSAY OF CREATININE: CPT | Performed by: OBSTETRICS & GYNECOLOGY

## 2019-10-29 PROCEDURE — 84550 ASSAY OF BLOOD/URIC ACID: CPT | Performed by: OBSTETRICS & GYNECOLOGY

## 2019-10-29 RX ORDER — TERBUTALINE SULFATE 1 MG/ML
0.25 INJECTION, SOLUTION SUBCUTANEOUS ONCE
Status: COMPLETED | OUTPATIENT
Start: 2019-10-29 | End: 2019-10-29

## 2019-10-29 RX ORDER — ONDANSETRON 2 MG/ML
4 INJECTION INTRAMUSCULAR; INTRAVENOUS EVERY 6 HOURS PRN
Status: DISCONTINUED | OUTPATIENT
Start: 2019-10-29 | End: 2019-11-01 | Stop reason: HOSPADM

## 2019-10-29 RX ORDER — DIPHENHYDRAMINE HCL 25 MG
25 CAPSULE ORAL EVERY 4 HOURS PRN
Status: DISCONTINUED | OUTPATIENT
Start: 2019-10-29 | End: 2019-11-01 | Stop reason: HOSPADM

## 2019-10-29 RX ORDER — OXYTOCIN 10 [USP'U]/ML
INJECTION, SOLUTION INTRAMUSCULAR; INTRAVENOUS AS NEEDED
Status: DISCONTINUED | OUTPATIENT
Start: 2019-10-29 | End: 2019-10-29 | Stop reason: SURG

## 2019-10-29 RX ORDER — FENTANYL CITRATE 50 UG/ML
INJECTION, SOLUTION INTRAMUSCULAR; INTRAVENOUS
Status: COMPLETED | OUTPATIENT
Start: 2019-10-29 | End: 2019-10-29

## 2019-10-29 RX ORDER — ONDANSETRON 2 MG/ML
INJECTION INTRAMUSCULAR; INTRAVENOUS AS NEEDED
Status: DISCONTINUED | OUTPATIENT
Start: 2019-10-29 | End: 2019-10-29 | Stop reason: SURG

## 2019-10-29 RX ORDER — METHYLERGONOVINE MALEATE 0.2 MG/ML
200 INJECTION INTRAVENOUS ONCE AS NEEDED
Status: COMPLETED | OUTPATIENT
Start: 2019-10-29 | End: 2019-10-29

## 2019-10-29 RX ORDER — ONDANSETRON 2 MG/ML
4 INJECTION INTRAMUSCULAR; INTRAVENOUS ONCE
Status: DISCONTINUED | OUTPATIENT
Start: 2019-10-29 | End: 2019-10-29 | Stop reason: HOSPADM

## 2019-10-29 RX ORDER — LIDOCAINE HYDROCHLORIDE 10 MG/ML
5 INJECTION, SOLUTION EPIDURAL; INFILTRATION; INTRACAUDAL; PERINEURAL AS NEEDED
Status: DISCONTINUED | OUTPATIENT
Start: 2019-10-29 | End: 2019-10-29

## 2019-10-29 RX ORDER — OXYCODONE HYDROCHLORIDE AND ACETAMINOPHEN 5; 325 MG/1; MG/1
2 TABLET ORAL EVERY 4 HOURS PRN
Status: DISCONTINUED | OUTPATIENT
Start: 2019-10-29 | End: 2019-11-01 | Stop reason: HOSPADM

## 2019-10-29 RX ORDER — SIMETHICONE 80 MG
80 TABLET,CHEWABLE ORAL 4 TIMES DAILY PRN
Status: DISCONTINUED | OUTPATIENT
Start: 2019-10-29 | End: 2019-11-01 | Stop reason: HOSPADM

## 2019-10-29 RX ORDER — IBUPROFEN 600 MG/1
600 TABLET ORAL ONCE AS NEEDED
Status: COMPLETED | OUTPATIENT
Start: 2019-10-29 | End: 2019-10-29

## 2019-10-29 RX ORDER — OXYTOCIN-SODIUM CHLORIDE 0.9% IV SOLN 30 UNIT/500ML 30-0.9/5 UT/ML-%
85 SOLUTION INTRAVENOUS ONCE
Status: DISCONTINUED | OUTPATIENT
Start: 2019-10-29 | End: 2019-10-29 | Stop reason: HOSPADM

## 2019-10-29 RX ORDER — PROMETHAZINE HYDROCHLORIDE 25 MG/ML
12.5 INJECTION, SOLUTION INTRAMUSCULAR; INTRAVENOUS EVERY 6 HOURS PRN
Status: DISCONTINUED | OUTPATIENT
Start: 2019-10-29 | End: 2019-11-01 | Stop reason: HOSPADM

## 2019-10-29 RX ORDER — MORPHINE SULFATE 0.5 MG/ML
INJECTION, SOLUTION EPIDURAL; INTRATHECAL; INTRAVENOUS
Status: COMPLETED | OUTPATIENT
Start: 2019-10-29 | End: 2019-10-29

## 2019-10-29 RX ORDER — DIPHENHYDRAMINE HYDROCHLORIDE 50 MG/ML
25 INJECTION INTRAMUSCULAR; INTRAVENOUS EVERY 4 HOURS PRN
Status: DISCONTINUED | OUTPATIENT
Start: 2019-10-29 | End: 2019-11-01 | Stop reason: HOSPADM

## 2019-10-29 RX ORDER — OXYTOCIN-SODIUM CHLORIDE 0.9% IV SOLN 30 UNIT/500ML 30-0.9/5 UT/ML-%
SOLUTION INTRAVENOUS
Status: DISPENSED
Start: 2019-10-29 | End: 2019-10-29

## 2019-10-29 RX ORDER — NALOXONE HCL 0.4 MG/ML
0.4 VIAL (ML) INJECTION
Status: ACTIVE | OUTPATIENT
Start: 2019-10-29 | End: 2019-10-30

## 2019-10-29 RX ORDER — OXYCODONE HYDROCHLORIDE AND ACETAMINOPHEN 5; 325 MG/1; MG/1
1 TABLET ORAL EVERY 4 HOURS PRN
Status: DISCONTINUED | OUTPATIENT
Start: 2019-10-29 | End: 2019-11-01 | Stop reason: HOSPADM

## 2019-10-29 RX ORDER — DEXTROSE, SODIUM CHLORIDE, SODIUM LACTATE, POTASSIUM CHLORIDE, AND CALCIUM CHLORIDE 5; .6; .31; .03; .02 G/100ML; G/100ML; G/100ML; G/100ML; G/100ML
125 INJECTION, SOLUTION INTRAVENOUS CONTINUOUS
Status: DISCONTINUED | OUTPATIENT
Start: 2019-10-29 | End: 2019-11-01 | Stop reason: HOSPADM

## 2019-10-29 RX ORDER — SODIUM CHLORIDE 0.9 % (FLUSH) 0.9 %
10 SYRINGE (ML) INJECTION AS NEEDED
Status: DISCONTINUED | OUTPATIENT
Start: 2019-10-29 | End: 2019-10-29

## 2019-10-29 RX ORDER — FAMOTIDINE 10 MG/ML
INJECTION, SOLUTION INTRAVENOUS AS NEEDED
Status: DISCONTINUED | OUTPATIENT
Start: 2019-10-29 | End: 2019-10-29 | Stop reason: SURG

## 2019-10-29 RX ORDER — METOCLOPRAMIDE HYDROCHLORIDE 5 MG/ML
INJECTION INTRAMUSCULAR; INTRAVENOUS AS NEEDED
Status: DISCONTINUED | OUTPATIENT
Start: 2019-10-29 | End: 2019-10-29 | Stop reason: SURG

## 2019-10-29 RX ORDER — SODIUM CHLORIDE 0.9 % (FLUSH) 0.9 %
3 SYRINGE (ML) INJECTION EVERY 12 HOURS SCHEDULED
Status: DISCONTINUED | OUTPATIENT
Start: 2019-10-29 | End: 2019-10-29

## 2019-10-29 RX ORDER — IPRATROPIUM BROMIDE AND ALBUTEROL SULFATE 2.5; .5 MG/3ML; MG/3ML
3 SOLUTION RESPIRATORY (INHALATION)
Status: DISCONTINUED | OUTPATIENT
Start: 2019-10-29 | End: 2019-10-31

## 2019-10-29 RX ORDER — CARBOPROST TROMETHAMINE 250 UG/ML
250 INJECTION, SOLUTION INTRAMUSCULAR AS NEEDED
Status: DISCONTINUED | OUTPATIENT
Start: 2019-10-29 | End: 2019-10-29 | Stop reason: HOSPADM

## 2019-10-29 RX ORDER — IBUPROFEN 600 MG/1
600 TABLET ORAL EVERY 6 HOURS PRN
Status: DISCONTINUED | OUTPATIENT
Start: 2019-10-29 | End: 2019-11-01 | Stop reason: HOSPADM

## 2019-10-29 RX ORDER — PROMETHAZINE HYDROCHLORIDE 25 MG/1
25 TABLET ORAL EVERY 6 HOURS PRN
Status: DISCONTINUED | OUTPATIENT
Start: 2019-10-29 | End: 2019-11-01 | Stop reason: HOSPADM

## 2019-10-29 RX ORDER — TRISODIUM CITRATE DIHYDRATE AND CITRIC ACID MONOHYDRATE 500; 334 MG/5ML; MG/5ML
30 SOLUTION ORAL ONCE
Status: COMPLETED | OUTPATIENT
Start: 2019-10-29 | End: 2019-10-29

## 2019-10-29 RX ORDER — BUPIVACAINE HYDROCHLORIDE 7.5 MG/ML
INJECTION, SOLUTION INTRASPINAL
Status: COMPLETED | OUTPATIENT
Start: 2019-10-29 | End: 2019-10-29

## 2019-10-29 RX ORDER — METHYLERGONOVINE MALEATE 0.2 MG/ML
200 INJECTION INTRAVENOUS ONCE AS NEEDED
Status: DISCONTINUED | OUTPATIENT
Start: 2019-10-29 | End: 2019-10-29 | Stop reason: HOSPADM

## 2019-10-29 RX ORDER — ONDANSETRON 4 MG/1
4 TABLET, FILM COATED ORAL EVERY 6 HOURS PRN
Status: DISCONTINUED | OUTPATIENT
Start: 2019-10-29 | End: 2019-11-01 | Stop reason: HOSPADM

## 2019-10-29 RX ORDER — PROMETHAZINE HYDROCHLORIDE 12.5 MG/1
12.5 SUPPOSITORY RECTAL EVERY 6 HOURS PRN
Status: DISCONTINUED | OUTPATIENT
Start: 2019-10-29 | End: 2019-11-01 | Stop reason: HOSPADM

## 2019-10-29 RX ORDER — OXYTOCIN-SODIUM CHLORIDE 0.9% IV SOLN 30 UNIT/500ML 30-0.9/5 UT/ML-%
650 SOLUTION INTRAVENOUS ONCE
Status: DISCONTINUED | OUTPATIENT
Start: 2019-10-29 | End: 2019-10-29 | Stop reason: HOSPADM

## 2019-10-29 RX ORDER — MISOPROSTOL 200 UG/1
800 TABLET ORAL AS NEEDED
Status: DISCONTINUED | OUTPATIENT
Start: 2019-10-29 | End: 2019-10-29 | Stop reason: HOSPADM

## 2019-10-29 RX ORDER — NICOTINE 21 MG/24HR
1 PATCH, TRANSDERMAL 24 HOURS TRANSDERMAL
Status: DISCONTINUED | OUTPATIENT
Start: 2019-10-29 | End: 2019-11-01 | Stop reason: HOSPADM

## 2019-10-29 RX ORDER — SODIUM CHLORIDE, SODIUM LACTATE, POTASSIUM CHLORIDE, CALCIUM CHLORIDE 600; 310; 30; 20 MG/100ML; MG/100ML; MG/100ML; MG/100ML
125 INJECTION, SOLUTION INTRAVENOUS CONTINUOUS
Status: DISCONTINUED | OUTPATIENT
Start: 2019-10-29 | End: 2019-10-29

## 2019-10-29 RX ORDER — CEFAZOLIN SODIUM 2 G/100ML
2 INJECTION, SOLUTION INTRAVENOUS ONCE
Status: COMPLETED | OUTPATIENT
Start: 2019-10-29 | End: 2019-10-29

## 2019-10-29 RX ORDER — OXYCODONE AND ACETAMINOPHEN 7.5; 325 MG/1; MG/1
1 TABLET ORAL ONCE AS NEEDED
Status: COMPLETED | OUTPATIENT
Start: 2019-10-29 | End: 2019-10-29

## 2019-10-29 RX ORDER — DOCUSATE SODIUM 100 MG/1
100 CAPSULE, LIQUID FILLED ORAL 2 TIMES DAILY PRN
Status: DISCONTINUED | OUTPATIENT
Start: 2019-10-29 | End: 2019-11-01 | Stop reason: HOSPADM

## 2019-10-29 RX ADMIN — ONDANSETRON 4 MG: 2 INJECTION INTRAMUSCULAR; INTRAVENOUS at 05:00

## 2019-10-29 RX ADMIN — OXYCODONE HYDROCHLORIDE AND ACETAMINOPHEN 1 TABLET: 5; 325 TABLET ORAL at 11:37

## 2019-10-29 RX ADMIN — TERBUTALINE SULFATE 0.25 MG: 1 INJECTION SUBCUTANEOUS at 04:36

## 2019-10-29 RX ADMIN — OXYCODONE HYDROCHLORIDE AND ACETAMINOPHEN 1 TABLET: 7.5; 325 TABLET ORAL at 07:30

## 2019-10-29 RX ADMIN — OXYCODONE HYDROCHLORIDE AND ACETAMINOPHEN 1 TABLET: 5; 325 TABLET ORAL at 12:22

## 2019-10-29 RX ADMIN — OXYTOCIN 10 UNITS: 10 INJECTION, SOLUTION INTRAMUSCULAR; INTRAVENOUS at 05:15

## 2019-10-29 RX ADMIN — BUPIVACAINE HYDROCHLORIDE IN DEXTROSE 1.5 ML: 7.5 INJECTION, SOLUTION SUBARACHNOID at 04:55

## 2019-10-29 RX ADMIN — SIMETHICONE CHEW TAB 80 MG 80 MG: 80 TABLET ORAL at 16:26

## 2019-10-29 RX ADMIN — IPRATROPIUM BROMIDE AND ALBUTEROL SULFATE 3 ML: 2.5; .5 SOLUTION RESPIRATORY (INHALATION) at 16:12

## 2019-10-29 RX ADMIN — FENTANYL CITRATE 15 MCG: 50 INJECTION, SOLUTION INTRAMUSCULAR; INTRAVENOUS at 04:55

## 2019-10-29 RX ADMIN — MORPHINE SULFATE 0.15 MG: 0.5 INJECTION, SOLUTION EPIDURAL; INTRATHECAL; INTRAVENOUS at 04:55

## 2019-10-29 RX ADMIN — FAMOTIDINE 20 MG: 10 INJECTION, SOLUTION INTRAVENOUS at 05:00

## 2019-10-29 RX ADMIN — OXYTOCIN 20 UNITS: 10 INJECTION, SOLUTION INTRAMUSCULAR; INTRAVENOUS at 05:30

## 2019-10-29 RX ADMIN — OXYTOCIN 20 UNITS: 10 INJECTION, SOLUTION INTRAMUSCULAR; INTRAVENOUS at 05:11

## 2019-10-29 RX ADMIN — METHYLERGONOVINE MALEATE 200 MCG: 0.2 INJECTION INTRAVENOUS at 11:36

## 2019-10-29 RX ADMIN — OXYCODONE HYDROCHLORIDE AND ACETAMINOPHEN 2 TABLET: 5; 325 TABLET ORAL at 16:26

## 2019-10-29 RX ADMIN — SODIUM CITRATE AND CITRIC ACID MONOHYDRATE 30 ML: 500; 334 SOLUTION ORAL at 04:27

## 2019-10-29 RX ADMIN — IBUPROFEN 600 MG: 600 TABLET ORAL at 20:34

## 2019-10-29 RX ADMIN — IBUPROFEN 600 MG: 600 TABLET ORAL at 13:14

## 2019-10-29 RX ADMIN — SODIUM CHLORIDE, POTASSIUM CHLORIDE, SODIUM LACTATE AND CALCIUM CHLORIDE 1000 ML: 600; 310; 30; 20 INJECTION, SOLUTION INTRAVENOUS at 04:13

## 2019-10-29 RX ADMIN — SODIUM CHLORIDE, POTASSIUM CHLORIDE, SODIUM LACTATE AND CALCIUM CHLORIDE: 600; 310; 30; 20 INJECTION, SOLUTION INTRAVENOUS at 05:05

## 2019-10-29 RX ADMIN — CEFAZOLIN SODIUM 2 G: 2 INJECTION, SOLUTION INTRAVENOUS at 04:26

## 2019-10-29 RX ADMIN — METOCLOPRAMIDE 10 MG: 5 INJECTION, SOLUTION INTRAMUSCULAR; INTRAVENOUS at 05:00

## 2019-10-29 RX ADMIN — OXYCODONE HYDROCHLORIDE AND ACETAMINOPHEN 2 TABLET: 5; 325 TABLET ORAL at 20:34

## 2019-10-29 RX ADMIN — IBUPROFEN 600 MG: 600 TABLET, FILM COATED ORAL at 07:30

## 2019-10-29 NOTE — ANESTHESIA POSTPROCEDURE EVALUATION
Patient: Sarah Goel    Procedure Summary     Date:  10/29/19 Room / Location:  [facility] LABOR DELIVERY   [facility]    Anesthesia Start:  446 Anesthesia Stop:  548    Procedure:  Primary  LTCS (2 layer closure) (N/A Abdomen) Diagnosis:       Delivery by elective  section      Gestational diabetes      Large for gestational age fetus affecting management of mother      Polyhydramnios      (LGA fetus)    Surgeon:  Tate Mullen MD Provider:  Lane Jalloh MD    Anesthesia Type:  spinal ASA Status:  3 - Emergent          Anesthesia Type: spinal  Last vitals  BP   149/85 (10/29/19 0339)   Temp   98.1 °F (36.7 °C) (10/29/19 0339)   Pulse       Resp   20 (10/29/19 0339)     SpO2         Post Anesthesia Care and Evaluation    Patient location during evaluation: bedside  Patient participation: complete - patient participated  Level of consciousness: awake and alert  Pain score: 0  Pain management: adequate  Airway patency: patent  Anesthetic complications: No anesthetic complications    Cardiovascular status: acceptable  Respiratory status: acceptable  Hydration status: acceptable

## 2019-10-29 NOTE — ANESTHESIA PREPROCEDURE EVALUATION
Anesthesia Evaluation     Patient summary reviewed and Nursing notes reviewed                Airway   Mallampati: I  TM distance: >3 FB  Neck ROM: full  No difficulty expected  Dental - normal exam     Pulmonary - negative pulmonary ROS and normal exam   Cardiovascular - negative cardio ROS and normal exam        Neuro/Psych- negative ROS  GI/Hepatic/Renal/Endo    (+)   diabetes mellitus gestational well controlled using insulin,     Musculoskeletal (-) negative ROS    Abdominal  - normal exam    Bowel sounds: normal.   Substance History - negative use     OB/GYN    (+) Pregnant, Preeclampsia, pregnancy induced hypertension        Other                        Anesthesia Plan    ASA 3 - emergent     spinal     Anesthetic plan, all risks, benefits, and alternatives have been provided, discussed and informed consent has been obtained with: patient.  Use of blood products discussed with patient .   Plan discussed with attending.

## 2019-10-30 LAB
HCT VFR BLD AUTO: 30.8 % (ref 34–46.6)
HGB BLD-MCNC: 9.9 G/DL (ref 12–15.9)

## 2019-10-30 PROCEDURE — 85014 HEMATOCRIT: CPT | Performed by: OBSTETRICS & GYNECOLOGY

## 2019-10-30 PROCEDURE — 99024 POSTOP FOLLOW-UP VISIT: CPT | Performed by: OBSTETRICS & GYNECOLOGY

## 2019-10-30 PROCEDURE — 85018 HEMOGLOBIN: CPT | Performed by: OBSTETRICS & GYNECOLOGY

## 2019-10-30 PROCEDURE — 94799 UNLISTED PULMONARY SVC/PX: CPT

## 2019-10-30 RX ORDER — OXYTOCIN-SODIUM CHLORIDE 0.9% IV SOLN 30 UNIT/500ML 30-0.9/5 UT/ML-%
85 SOLUTION INTRAVENOUS ONCE
Status: DISCONTINUED | OUTPATIENT
Start: 2019-10-30 | End: 2019-11-01 | Stop reason: HOSPADM

## 2019-10-30 RX ORDER — OXYTOCIN-SODIUM CHLORIDE 0.9% IV SOLN 30 UNIT/500ML 30-0.9/5 UT/ML-%
125 SOLUTION INTRAVENOUS ONCE
Status: DISCONTINUED | OUTPATIENT
Start: 2019-10-30 | End: 2019-11-01 | Stop reason: HOSPADM

## 2019-10-30 RX ADMIN — OXYCODONE HYDROCHLORIDE AND ACETAMINOPHEN 2 TABLET: 5; 325 TABLET ORAL at 19:32

## 2019-10-30 RX ADMIN — IPRATROPIUM BROMIDE AND ALBUTEROL SULFATE 3 ML: 2.5; .5 SOLUTION RESPIRATORY (INHALATION) at 20:22

## 2019-10-30 RX ADMIN — OXYCODONE HYDROCHLORIDE AND ACETAMINOPHEN 2 TABLET: 5; 325 TABLET ORAL at 11:36

## 2019-10-30 RX ADMIN — IPRATROPIUM BROMIDE AND ALBUTEROL SULFATE 3 ML: 2.5; .5 SOLUTION RESPIRATORY (INHALATION) at 17:03

## 2019-10-30 RX ADMIN — IBUPROFEN 600 MG: 600 TABLET ORAL at 13:44

## 2019-10-30 RX ADMIN — OXYCODONE HYDROCHLORIDE AND ACETAMINOPHEN 2 TABLET: 5; 325 TABLET ORAL at 00:38

## 2019-10-30 RX ADMIN — OXYCODONE HYDROCHLORIDE AND ACETAMINOPHEN 2 TABLET: 5; 325 TABLET ORAL at 08:38

## 2019-10-30 RX ADMIN — IBUPROFEN 600 MG: 600 TABLET ORAL at 22:56

## 2019-10-30 RX ADMIN — OXYCODONE HYDROCHLORIDE AND ACETAMINOPHEN 2 TABLET: 5; 325 TABLET ORAL at 05:18

## 2019-10-30 RX ADMIN — OXYCODONE HYDROCHLORIDE AND ACETAMINOPHEN 2 TABLET: 5; 325 TABLET ORAL at 15:45

## 2019-10-30 RX ADMIN — DOCUSATE SODIUM 100 MG: 100 CAPSULE, LIQUID FILLED ORAL at 08:37

## 2019-10-30 RX ADMIN — IBUPROFEN 600 MG: 600 TABLET ORAL at 02:39

## 2019-10-30 RX ADMIN — IPRATROPIUM BROMIDE AND ALBUTEROL SULFATE 3 ML: 2.5; .5 SOLUTION RESPIRATORY (INHALATION) at 12:13

## 2019-10-30 RX ADMIN — IBUPROFEN 600 MG: 600 TABLET ORAL at 08:37

## 2019-10-30 NOTE — ANESTHESIA POSTPROCEDURE EVALUATION
Patient: Sarah Goel    Procedure Summary     Date:  10/29/19 Room / Location:  [facility] LABOR DELIVERY   [facility]    Anesthesia Start:  446 Anesthesia Stop:  548    Procedure:  Primary  LTCS (2 layer closure) (N/A Abdomen) Diagnosis:       Delivery by elective  section      Gestational diabetes      Large for gestational age fetus affecting management of mother      Polyhydramnios      (LGA fetus)    Surgeon:  Tate Mullen MD Provider:  Lane Jalloh MD    Anesthesia Type:  spinal ASA Status:  3 - Emergent          Anesthesia Type: spinal  Last vitals  BP   104/55 (10/30/19 0400)   Temp   98.1 °F (36.7 °C) (10/30/19 0400)   Pulse   90 (10/30/19 0400)   Resp   16 (10/30/19 0400)     SpO2   100 % (10/29/19 0700)     Post Anesthesia Care and Evaluation    Patient location during evaluation: bedside  Patient participation: complete - patient participated  Level of consciousness: awake  Pain score: 5  Pain management: satisfactory to patient  Airway patency: patent  Anesthetic complications: No anesthetic complications  PONV Status: none  Cardiovascular status: acceptable and hemodynamically stable  Respiratory status: acceptable  Hydration status: acceptable  Post Neuraxial Block status: Motor and sensory function returned to baseline and No signs or symptoms of PDPH

## 2019-10-31 PROCEDURE — 99024 POSTOP FOLLOW-UP VISIT: CPT | Performed by: OBSTETRICS & GYNECOLOGY

## 2019-10-31 PROCEDURE — 94799 UNLISTED PULMONARY SVC/PX: CPT

## 2019-10-31 RX ORDER — IPRATROPIUM BROMIDE AND ALBUTEROL SULFATE 2.5; .5 MG/3ML; MG/3ML
3 SOLUTION RESPIRATORY (INHALATION) EVERY 4 HOURS PRN
Status: DISCONTINUED | OUTPATIENT
Start: 2019-10-31 | End: 2019-11-01 | Stop reason: HOSPADM

## 2019-10-31 RX ADMIN — IBUPROFEN 600 MG: 600 TABLET ORAL at 06:35

## 2019-10-31 RX ADMIN — IBUPROFEN 600 MG: 600 TABLET ORAL at 20:23

## 2019-10-31 RX ADMIN — IPRATROPIUM BROMIDE AND ALBUTEROL SULFATE 3 ML: 2.5; .5 SOLUTION RESPIRATORY (INHALATION) at 12:04

## 2019-10-31 RX ADMIN — SIMETHICONE CHEW TAB 80 MG 80 MG: 80 TABLET ORAL at 13:35

## 2019-10-31 RX ADMIN — DOCUSATE SODIUM 100 MG: 100 CAPSULE, LIQUID FILLED ORAL at 08:04

## 2019-10-31 RX ADMIN — OXYCODONE HYDROCHLORIDE AND ACETAMINOPHEN 2 TABLET: 5; 325 TABLET ORAL at 03:28

## 2019-10-31 RX ADMIN — SIMETHICONE CHEW TAB 80 MG 80 MG: 80 TABLET ORAL at 08:04

## 2019-10-31 RX ADMIN — IPRATROPIUM BROMIDE AND ALBUTEROL SULFATE 3 ML: 2.5; .5 SOLUTION RESPIRATORY (INHALATION) at 16:14

## 2019-10-31 RX ADMIN — IPRATROPIUM BROMIDE AND ALBUTEROL SULFATE 3 ML: 2.5; .5 SOLUTION RESPIRATORY (INHALATION) at 07:56

## 2019-10-31 RX ADMIN — DOCUSATE SODIUM 100 MG: 100 CAPSULE, LIQUID FILLED ORAL at 08:11

## 2019-10-31 RX ADMIN — DOCUSATE SODIUM 100 MG: 100 CAPSULE, LIQUID FILLED ORAL at 20:23

## 2019-10-31 RX ADMIN — OXYCODONE HYDROCHLORIDE AND ACETAMINOPHEN 2 TABLET: 5; 325 TABLET ORAL at 13:35

## 2019-11-01 VITALS
WEIGHT: 188 LBS | DIASTOLIC BLOOD PRESSURE: 70 MMHG | BODY MASS INDEX: 34.6 KG/M2 | OXYGEN SATURATION: 100 % | RESPIRATION RATE: 18 BRPM | HEART RATE: 110 BPM | TEMPERATURE: 99 F | HEIGHT: 62 IN | SYSTOLIC BLOOD PRESSURE: 125 MMHG

## 2019-11-01 PROCEDURE — 99024 POSTOP FOLLOW-UP VISIT: CPT | Performed by: OBSTETRICS & GYNECOLOGY

## 2019-11-01 RX ORDER — FERROUS SULFATE 325(65) MG
325 TABLET ORAL
Qty: 60 TABLET | Refills: 3 | Status: SHIPPED | OUTPATIENT
Start: 2019-11-01 | End: 2020-01-21 | Stop reason: SDDI

## 2019-11-01 RX ORDER — IBUPROFEN 600 MG/1
600 TABLET ORAL EVERY 6 HOURS PRN
Qty: 60 TABLET | Refills: 0 | Status: SHIPPED | OUTPATIENT
Start: 2019-11-01

## 2019-11-01 RX ORDER — OXYCODONE HYDROCHLORIDE AND ACETAMINOPHEN 5; 325 MG/1; MG/1
1 TABLET ORAL EVERY 4 HOURS PRN
Qty: 30 TABLET | Refills: 0 | Status: SHIPPED | OUTPATIENT
Start: 2019-11-01 | End: 2020-01-21 | Stop reason: SDDI

## 2019-11-01 RX ADMIN — SIMETHICONE CHEW TAB 80 MG 80 MG: 80 TABLET ORAL at 08:31

## 2019-11-01 RX ADMIN — OXYCODONE HYDROCHLORIDE AND ACETAMINOPHEN 2 TABLET: 5; 325 TABLET ORAL at 08:31

## 2019-11-01 RX ADMIN — DOCUSATE SODIUM 100 MG: 100 CAPSULE, LIQUID FILLED ORAL at 08:31

## 2019-11-01 RX ADMIN — OXYCODONE HYDROCHLORIDE AND ACETAMINOPHEN 2 TABLET: 5; 325 TABLET ORAL at 00:52

## 2019-11-07 NOTE — TELEPHONE ENCOUNTER
Received refill request for the medication pended but different ones listed in chart. Called and spoke with pt and she delivered on 10/29 and has not taken insulin since then. She wanted to know if she should even be taking it anymore. Please advise. Thanks!

## 2019-11-12 NOTE — TELEPHONE ENCOUNTER
LVM and told pt per Dr Scott: stop medication after delivery and schedule apt 6 weeks postpartum.

## 2019-11-14 ENCOUNTER — POSTPARTUM VISIT (OUTPATIENT)
Dept: OBSTETRICS AND GYNECOLOGY | Facility: CLINIC | Age: 39
End: 2019-11-14

## 2019-11-14 VITALS
SYSTOLIC BLOOD PRESSURE: 128 MMHG | HEIGHT: 62 IN | WEIGHT: 167 LBS | DIASTOLIC BLOOD PRESSURE: 60 MMHG | BODY MASS INDEX: 30.73 KG/M2

## 2019-11-14 PROCEDURE — 99024 POSTOP FOLLOW-UP VISIT: CPT | Performed by: OBSTETRICS & GYNECOLOGY

## 2019-11-14 NOTE — PROGRESS NOTES
"Subjective   Chief Complaint   Patient presents with   • Postpartum Care     PPV 2w2d; no c/o     Sarah Goel is a 39 y.o. year old  presenting to be seen for her post-operative visit.  Currently she reports no problems with eating, bowel movements, voiding, or wound drainage and pain is well controlled.    EPDS: 15. Patient reports she has felt overwhelmed. Not necessarily with babybut  with other life stressors. She reports there continues to be strain with the father of the baby.     OTHER THINGS SHE WANTS TO DISCUSS TODAY:  Nothing else    The following portions of the patient's history were reviewed and updated as appropriate:current medications and allergies       Objective   /60   Ht 157.5 cm (62\")   Wt 75.8 kg (167 lb)   LMP 2019 (Exact Date)   Breastfeeding? No   BMI 30.54 kg/m²     General:  well developed; well nourished  no acute distress   Abdomen: soft, non-tender; no masses  no umbilical or inguinal hernias are present  no hepato-splenomegaly  incision is healed   Pelvis: Not performed.          Assessment   1. S/P   2. Anxiety     Plan   1. OK to drive  2. Lifting restriction of no more than 15 pounds  3. Nothing per vagina still until 6 weeks after her procedure  4. Rx Zoloft. Counseled patient it can take 2-3 weeks to take full effect. If symptoms worsen patient instructed to call. Patient instructed not to abruptly discontinue medication. If she experiences any SI/HI, she should immediately call 911 and/or go to the nearest ED.  5. The importance of keeping all planned follow-up and taking all medications as prescribed was emphasized.  6. Follow up for postpartum visit in 4 weeks.    New Medications Ordered This Visit   Medications   • sertraline (ZOLOFT) 50 MG tablet     Sig: Take 1 tablet by mouth Daily.     Dispense:  30 tablet     Refill:  2          This note was electronically signed.    Eliza Seymour, DO  2019    Note: Speech recognition " transcription software may have been used to create portions of this document.  An attempt at proofreading has been made but errors in transcription could still be present.

## 2019-11-26 ENCOUNTER — TELEPHONE (OUTPATIENT)
Dept: OBSTETRICS AND GYNECOLOGY | Facility: CLINIC | Age: 39
End: 2019-11-26

## 2019-11-27 RX ORDER — ACETAMINOPHEN AND CODEINE PHOSPHATE 120; 12 MG/5ML; MG/5ML
1 SOLUTION ORAL DAILY
Qty: 28 TABLET | Refills: 12 | Status: SHIPPED | OUTPATIENT
Start: 2019-11-27 | End: 2020-01-21 | Stop reason: SDDI

## 2019-12-18 ENCOUNTER — TELEPHONE (OUTPATIENT)
Dept: OBSTETRICS AND GYNECOLOGY | Facility: CLINIC | Age: 39
End: 2019-12-18

## 2019-12-18 NOTE — TELEPHONE ENCOUNTER
Called patient regarding no show, no answer, unable to leave message, sent letter to patient    Citlali Oquendo

## 2020-01-07 ENCOUNTER — TELEPHONE (OUTPATIENT)
Dept: OBSTETRICS AND GYNECOLOGY | Facility: CLINIC | Age: 40
End: 2020-01-07

## 2020-01-07 NOTE — TELEPHONE ENCOUNTER
Second call regarding missed appt on 12/17/19.    Unable to reach or to leave message.    Letter mailed to Pt.

## 2020-01-21 ENCOUNTER — OFFICE VISIT (OUTPATIENT)
Dept: OBSTETRICS AND GYNECOLOGY | Facility: CLINIC | Age: 40
End: 2020-01-21

## 2020-01-21 VITALS
DIASTOLIC BLOOD PRESSURE: 68 MMHG | BODY MASS INDEX: 30.29 KG/M2 | SYSTOLIC BLOOD PRESSURE: 118 MMHG | WEIGHT: 164.6 LBS | HEIGHT: 62 IN

## 2020-01-21 DIAGNOSIS — Z11.3 ROUTINE SCREENING FOR STI (SEXUALLY TRANSMITTED INFECTION): ICD-10-CM

## 2020-01-21 PROCEDURE — 0503F POSTPARTUM CARE VISIT: CPT | Performed by: OBSTETRICS & GYNECOLOGY

## 2020-01-21 NOTE — PROGRESS NOTES
"Subjective   Chief Complaint   Patient presents with   • Gynecologic Exam     discuss tubal; requesting STD check-swab and lab work     Sarah Goel is a 39 y.o. year old  presenting to be seen for her postpartum visit.  She had a Primary  (LTCS).  Her son is doing well.    Since delivery she has been sexually active.  She does have concerns about post-partum blues/depression. She declines medication or counseling. She denies SI/HI.   She is bottle feeding.  For ongoing contraception, her plans are undecided.    The following portions of the patient's history were reviewed and updated as appropriate:current medications and allergies    Social History    Tobacco Use      Smoking status: Current Every Day Smoker        Packs/day: 1.00        Types: Cigarettes        Start date:       Smokeless tobacco: Never Used      Review of Systems  Constitutional POS: nothing reported    NEG: anorexia or night sweats   Genitourinary POS: nothing reported    NEG: dysuria or hematuria      Gastointestinal POS: nothing reported    NEG: bloating, change in bowel habits, melena or reflux symptoms   Breast POS: nothing reported    NEG: persistent breast lump, skin dimpling or nipple discharge        Objective   /68   Ht 157.5 cm (62\")   Wt 74.7 kg (164 lb 9.6 oz)   LMP 2020 (Exact Date)   Breastfeeding No   BMI 30.11 kg/m²       General: well developed; well nourished  no acute distress   Skin: No suspicious lesions seen   Thyroid: normal to inspection and palpation   Heart:  Not performed.   Lungs: breathing is unlabored   Breasts:  Examined in supine position  Symmetric without masses or skin dimpling  Nipples normal without inversion, lesions or discharge  There are no palpable axillary nodes   Abdomen: soft, non-tender; no masses  no umbilical or inguinal hernias are present  no hepato-splenomegaly  incision is healed   Pelvis: Clinical staff was present for exam  External genitalia:  normal " appearance of the external genitalia including Bartholin's and [place]'s glands.  :  urethral meatus normal;  Vaginal:  normal pink mucosa without prolapse or lesions.  Cervix:  normal appearance.  Uterus:  normal size, shape and consistency.  Adnexa:  normal bimanual exam of the adnexa.          Assessment   1. Normal postpartum exam  2. Routine STI screening  3. Depression/Anxiety     Plan   1. BC options reviewed and compared today: IUD - Mirena, IUD - Paraguard, Nexplanon, tubal ligation and salpingectomy . Patient would like a Mirena IUD.  2. Patient declines medication or counseling. She denies any SI/HI. Counseled patient if she changes her mind to call the office.  The following tests were ordered today: HIV, RPR, STD swabs for GC, chlamydia and trichimoniasis and Hepatitis B&C.  It was explained to Sarah that all lab test should be back within the one week after they are performed. She will be notified about the results, regardless of the findings. If she has not been contacted by the office within 2 weeks after the test has been performed, it is her responsibility to contact us to learn about her results.  3. The importance of keeping all planned follow-up and taking all medications as prescribed was emphasized.  4. Follow up for IUD placement in 1-2 weeks.    No orders of the defined types were placed in this encounter.         This note was electronically signed.    Eliza Seymour,   January 21, 2020    Note: Speech recognition transcription software may have been used to create portions of this document.  An attempt at proofreading has been made but errors in transcription could still be present.

## 2020-02-05 ENCOUNTER — TELEPHONE (OUTPATIENT)
Dept: OBSTETRICS AND GYNECOLOGY | Facility: CLINIC | Age: 40
End: 2020-02-05

## 2020-02-05 NOTE — TELEPHONE ENCOUNTER
Called patient 2/5/2020 left message missed appointment today and to call and reschedule appointment.

## 2020-02-06 NOTE — TELEPHONE ENCOUNTER
Called patient 2/6/2020 and left second message missed appointment yesterday and to call and reschedule. Mailed NO Show Letter.

## 2020-06-22 RX ORDER — ACETAMINOPHEN AND CODEINE PHOSPHATE 120; 12 MG/5ML; MG/5ML
SOLUTION ORAL
Qty: 84 TABLET | Refills: 1 | Status: SHIPPED | OUTPATIENT
Start: 2020-06-22

## 2020-12-01 ENCOUNTER — TRANSCRIBE ORDERS (OUTPATIENT)
Dept: PHYSICAL THERAPY | Facility: CLINIC | Age: 40
End: 2020-12-01

## 2020-12-01 ENCOUNTER — TREATMENT (OUTPATIENT)
Dept: PHYSICAL THERAPY | Facility: CLINIC | Age: 40
End: 2020-12-01

## 2020-12-01 DIAGNOSIS — M89.8X1 PAIN OF RIGHT SCAPULA: Primary | ICD-10-CM

## 2020-12-01 DIAGNOSIS — M54.9 DORSALGIA: ICD-10-CM

## 2020-12-01 PROCEDURE — 97161 PT EVAL LOW COMPLEX 20 MIN: CPT | Performed by: PHYSICAL THERAPIST

## 2020-12-01 PROCEDURE — 97110 THERAPEUTIC EXERCISES: CPT | Performed by: PHYSICAL THERAPIST

## 2020-12-01 NOTE — PROGRESS NOTES
"Physical Therapy Initial Evaluation and Plan of Care        Patient: Sarah Goel   : 1980  Diagnosis/ICD-10 Code:  Pain of right scapula [M89.8X1]  Referring practitioner: LAVONNE Qiu  Date of Initial Visit: 2020  Today's Date: 2020  Patient seen for 1 sessions           Subjective Questionnaire:  Oswestry: 26%      Subjective Evaluation    History of Present Illness  Date of onset: 2020  Mechanism of injury: Patient notes that she has had chronic issues out of her right scapular region that has responded well to steroids and some self stretching and Cox2 anti-inflammtories.  She reports worsening pain with prolonged positions such as sleeping.  She wants to learn good exercises for the issue as it is improving.  She reports the pain was worse earlier in the year.  She reports thoracic region back pain and \"swollen and stiff\" feeling along the scapular border.      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    Palpation     Additional Palpation Details  TTP right scapular border, hypomobility of the thoracic spine.  Good PIVM of the lower cervical spine.      Active Range of Motion   Cervical/Thoracic Spine   Cervical    Flexion: 55 degrees with pain  Extension: 25 degrees   Left rotation: WFL  Right rotation: WFL    Passive Range of Motion   Left Shoulder   Normal passive range of motion    Right Shoulder   Normal passive range of " motion    Scapular Mobility   Left Shoulder   Scapular Mobility with Shoulder to 90° FF   Scapular winging: minimal    Scapular Mobility beyond 90° FF   Scapular winging: moderate    Right Shoulder   Scapular Mobility with Shoulder to 90° FF   Scapular winging: minimal    Scapular Mobility beyond 90° FF   Scapular winging: moderate  Upward rotation: inadequate    Strength/Myotome Testing     Left Shoulder     Planes of Motion   Flexion: 4+   Abduction: 4+   External rotation at 0°: 5   Internal rotation at 0°: 5     Isolated Muscles   Biceps: 5   Triceps: 5     Right Shoulder     Planes of Motion   Flexion: 4+   Abduction: 4+   External rotation at 0°: 5   Internal rotation at 0°: 5     Isolated Muscles   Biceps: 5   Triceps: 5     Left Elbow   Flexion: 5  Extension: 5    Right Elbow   Flexion: 5  Extension: 5          Assessment & Plan     Assessment  Impairments: abnormal or restricted ROM, activity intolerance, impaired physical strength, lacks appropriate home exercise program and pain with function  Assessment details: Patient presents with 1 year plus duration right sided scapular pain and thoracic pain with hypomobility.  She has a large one year old baby and has to frequently carry him which likely contributes.  She demonstrates limited cervical extension but near normal ROM otherwise.  She should respond well to an appropriately progressed home exercise program.  Functional Limitations: carrying objects, lifting, pulling, pushing, uncomfortable because of pain, reaching overhead and unable to perform repetitive tasks  Goals  Plan Goals: Short Term Goals 2 weeks  1. Patient to be compliant with initial HEP  2. Patient to demonstrate pain free cervical flexion.      Long Term Goals 4 weeks  1. Patient to be independent with HEP.  2. Patient to report pain 0/10 at least 75% of the time  3. Patient to initiate her new job without increased pain  4. Patient to demonstrate minimal palpable tenderness along the  thoracic/scapular region.  5. Patient to improve NDI score to at least 10 %.        Plan  Therapy options: will be seen for skilled physical therapy services  Planned modality interventions: TENS, traction, thermotherapy (hydrocollator packs), cryotherapy, ultrasound and dry needling  Planned therapy interventions: manual therapy, strengthening, stretching, postural training, joint mobilization and neuromuscular re-education  Frequency: 1x week  Duration in visits: 8  Treatment plan discussed with: patient  Plan details: 1x wk up to 8 weeks for progression of postural exercises, thoracic mobility, scapular stabilization, manual and modalities as indicated based on pain and function.        Timed:  Manual Therapy:    0     mins  06530;  Therapeutic Exercise:    13     mins  44777;     Neuromuscular Alejandra:    0    mins  33265;    Therapeutic Activity:     0     mins  35767;     Gait Trainin     mins  34429;     Ultrasound:     0     mins  94310;    Electrical Stimulation:    0     mins  47139 ( );    Untimed:  Electrical Stimulation:    0     mins  95229 ( );  Mechanical Traction:    0     mins  47943;     Timed Treatment:   13   mins   Total Treatment:     38   mins    PT SIGNATURE: John Lloyd, PT   DATE TREATMENT INITIATED: 2020    Initial Certification  Certification Period: 3/1/2021  I certify that the therapy services are furnished while this patient is under my care.  The services outlined above are required by this patient, and will be reviewed every 90 days.     PHYSICIAN: Alida Dalton APRN      DATE:     Please sign and return via fax to [phone].. Thank you, [facility].

## 2021-02-19 ENCOUNTER — TREATMENT (OUTPATIENT)
Dept: PHYSICAL THERAPY | Facility: CLINIC | Age: 41
End: 2021-02-19

## 2021-02-19 DIAGNOSIS — G89.29 CHRONIC UPPER BACK PAIN: Primary | ICD-10-CM

## 2021-02-19 DIAGNOSIS — M54.9 CHRONIC UPPER BACK PAIN: Primary | ICD-10-CM

## 2021-02-19 PROCEDURE — 97110 THERAPEUTIC EXERCISES: CPT | Performed by: PHYSICAL THERAPIST

## 2021-02-19 PROCEDURE — 97161 PT EVAL LOW COMPLEX 20 MIN: CPT | Performed by: PHYSICAL THERAPIST

## 2021-02-19 NOTE — PATIENT INSTRUCTIONS
Issued initial HEP including self thoracic mobilization techniques, doorway pec stretching, and quadruped thoracic rotation.

## 2021-02-19 NOTE — PROGRESS NOTES
Physical Therapy Initial Evaluation and Plan of Care      Patient: Sarah Goel   : 1980  Diagnosis/ICD-10 Code:  Chronic upper back pain [M54.9, G89.29]  Referring practitioner: LAVONNE Qiu  Date of Initial Visit: 2021  Today's Date: 2021  Patient seen for 1 sessions           Subjective Questionnaire: QuickDASH: 13.89% impairment    Subjective Evaluation    History of Present Illness  Mechanism of injury: Chronic midline and R upper back pn for 20+ years. Worsened after having her son who is now a year old, having to  him up and carry him throughout the day. Pn is intermittent. Has no pn at rest. Pn increases w/ turning her head or twisting her body, bending forward repetitively to pick things up off the ground. Sore/stiff when she first wakes up in the morning. Denies any radicular symptoms. Reports headaches, no more than 1/wk. Located in the back of the head and neck bilaterally.  Attended one PT appt last December but then started a new job so did not return for treatment. Has had an XR but unremarkable. No other formal tx.     Subjective comment: chronic upper back pn  Patient Occupation: [employer] Quality of life: good    Pain  Current pain ratin  At best pain ratin  At worst pain ratin  Quality: dull ache and tight  Relieving factors: change in position, ice, heat and rest  Aggravating factors: movement, repetitive movement and sleeping  Progression: no change    Social Support  Lives with: young children    Hand dominance: right    Diagnostic Tests  X-ray: abnormal    Treatments  No previous or current treatments  Patient Goals  Patient goals for therapy: increased strength, independence with ADLs/IADLs, decreased pain, increased motion and return to sport/leisure activities           Treatment  Exercise 1  Exercise Name 1: thoracic extn over foam roller  Time: 3 min  Exercise 2  Exercise Name 2: thoracic extn over chair back  Time 2: 3  min  Exercise 3  Exercise Name 3: quadruped thoracic rotation  Sets/Reps 3: 15 B  Exercise 4  Exercise Name 4: doorway pec stretch  Sets/Reps 4: 3  Time 4: 15 sec         Objective          Static Posture     Head  Forward.    Shoulders  Rounded.    Thoracic Spine  Flattened thoracic spine.    Palpation     Additional Palpation Details  No TTP noted throughout the cervicothoracic spine or periscapular region    Active Range of Motion   Cervical/Thoracic Spine   Normal active range of motion  Left Shoulder   Normal active range of motion    Right Shoulder   Normal active range of motion    Lumbar   Normal active range of motion    Additional Active Range of Motion Details  Mild discomfort R cervical region w/ R rotation, end range flxn    Passive Range of Motion     Additional Passive Range of Motion Details  Hypomobility of the lower cervical and thoracic segments, no pn reproduction    Scapular Mobility   Left Shoulder   Scapular mobility: WFL  Scapular Mobility with Shoulder to 90° FF   Scapular winging: minimal    Scapular Mobility beyond 90° FF   Scapular winging: minimal    Right Shoulder   Scapular mobility: WFL  Scapular Mobility with Shoulder to 90° FF   Scapular winging: minimal    Scapular Mobility beyond 90° FF   Scapular winging: minimal    Strength/Myotome Testing     Left Shoulder     Planes of Motion   Flexion: 5   Abduction: 5   External rotation at 0°: 4+   Internal rotation at 0°: 5     Isolated Muscles   Lower trapezius: 4   Middle trapezius: 4     Right Shoulder     Planes of Motion   Flexion: 5   Abduction: 5   External rotation at 0°: 4+   Internal rotation at 0°: 5     Isolated Muscles   Lower trapezius: 4   Middle trapezius: 4     Tests     Additional Tests Details  Quadrant (-)    Cervical Flexibility Comments:   Upper trap: no impairment  Levator Scapula: no impairment  Pec Major: mild impairment  Pec Minor: mild impairment            Assessment & Plan     Assessment  Impairments: abnormal  or restricted ROM, activity intolerance, impaired physical strength, lacks appropriate home exercise program and pain with function  Assessment details: Pt is a 40 YOF who presents to PT w/ evolving symptoms of low complexity. She complains of chronic upper back pn x20+ years, atraumatic in onset. She exhibits forward head, rounded shoulder posture w/ impaired postural strength. Cervical, thoracic, shoulder ROM intact. She is hypomobile throughout the cervical and thoracic segments. Her pn and deficits limit her ability to perform daily and recreational activities. She would benefit from skilled PT services to address deficits, decrease pn, and maximize function.  Barriers to therapy: chronicity of condition  Prognosis: good  Functional Limitations: carrying objects, lifting, sleeping, uncomfortable because of pain and unable to perform repetitive tasks  Goals  Plan Goals: STG 4 wks  1) Pt to be compliant w/ initial HEP for ROM, strength and symptom mgmt.  2) Pt to report at least a 30% improvement in symptoms.  3) Pt to verbalize/demonstrate appropriate ergonomics w/ seated computer work.    LTG 8 wks  1) Pt to be independent w/ long term HEP and self mgmt.  2) Pt to report at least a 60% improvement in symptoms.  3) Pt to improve B mid/lower trap strength to 4+/5 or better.    Plan  Therapy options: will be seen for skilled physical therapy services  Planned modality interventions: cryotherapy, thermotherapy (hydrocollator packs), TENS, ultrasound and dry needling  Planned therapy interventions: flexibility, functional ROM exercises, home exercise program, manual therapy, joint mobilization, neuromuscular re-education, postural training, soft tissue mobilization, spinal/joint mobilization, strengthening, stretching and therapeutic activities  Frequency: 1x week  Duration in visits: 15  Treatment plan discussed with: patient  Plan details: PT POC to include progressive postural strengthening, stretching program,  manual therapy techniques, modalities as indicated for pn control        Timed:  Manual Therapy:    0     mins  87066;  Therapeutic Exercise:    10     mins  16020;     Neuromuscular Alejandra:    0    mins  10657;    Therapeutic Activity:     0     mins  31964;     Gait Trainin     mins  38513;     Ultrasound:     0     mins  75351;    Electrical Stimulation:    0     mins  99720 ( );    Untimed:  Electrical Stimulation:    0     mins  96298 ( );  Mechanical Traction:    0     mins  23925;     Timed Treatment:   10   mins   Total Treatment:     45   mins    PT SIGNATURE: Jodie Lloyd, PT   DATE TREATMENT INITIATED: 2021    Initial Certification  Certification Period: 2021  I certify that the therapy services are furnished while this patient is under my care.  The services outlined above are required by this patient, and will be reviewed every 90 days.     PHYSICIAN: Alida Dalton APRN      DATE:     Please sign and return via fax to [phone]. Thank you, [facility].

## 2021-02-26 ENCOUNTER — TREATMENT (OUTPATIENT)
Dept: PHYSICAL THERAPY | Facility: CLINIC | Age: 41
End: 2021-02-26

## 2021-02-26 DIAGNOSIS — G89.29 CHRONIC UPPER BACK PAIN: Primary | ICD-10-CM

## 2021-02-26 DIAGNOSIS — M54.9 CHRONIC UPPER BACK PAIN: Primary | ICD-10-CM

## 2021-02-26 PROCEDURE — 97110 THERAPEUTIC EXERCISES: CPT | Performed by: PHYSICAL THERAPIST

## 2021-02-26 NOTE — PROGRESS NOTES
Physical Therapy Daily Progress Note  Visit: 2      Patient: Sarah Goel   : 1980  Referring practitioner: LAVONNE Qiu  Visit Diagnosis:     ICD-10-CM ICD-9-CM   1. Chronic upper back pain  M54.9 724.5    G89.29 338.29     Date of Initial Visit: Type: THERAPY  Noted: 2021  Today's Date: 2021        Subjective     Sarah Goel reports: Has had a hectic week but does not recall any intense pn. Exercises have been going well. No pn this afternoon.    Treatment  Pre Treatment Pn Score: 0  Post Treatment Pn Score:  0    Exercise 1  Exercise Name 1: thoracic extn over foam roller  Time: 3 min  Exercise 2  Exercise Name 2: thoracic extn over chair back  Time 2: 3 min  Exercise 3  Exercise Name 3: quadruped thoracic rotation  Sets/Reps 3: 15 B  Exercise 4  Exercise Name 4: doorway pec stretch  Sets/Reps 4: 3  Time 4: 15 sec  Exercise 5  Exercise Name 5: rows  Equipment/Resistance 5: RTB  Sets/Reps 5: 30  Exercise 6  Exercise Name 6: shoulder extn  Equipment/Resistance 6: RTB  Sets/Reps 6: 30  Exercise 7  Exercise Name 7: lat pulls  Equipment/Resistance 7: RTB  Sets/Reps 7: 30  Exercise 8  Exercise Name 8: no money  Equipment/Resistance 8: RTB  Sets/Reps 8: 30  Exercise 9  Exercise Name 9: scap slides at wall  Sets/Reps 9: 15            Objective       Assessment & Plan     Assessment  Assessment details: Pt compliant w/ initial HEP and tolerating exercises very well. Focused today on thoracic mobility and postural/scapular strengthening. She reported mild fatigue but no pn w/ today's exercises. At end of visit she reported reduced cervical tension. Added TB exercises to HEP as detailed in exercise flow sheet.    Plan  Plan details: Assess response to HEP additions and continue as indicated               Timed:  Manual Therapy:    0     mins  32585;  Therapeutic Exercise:    34     mins  92503;     Neuromuscular Alejandra:    0    mins  56986;    Therapeutic Activity:     0     mins  81447;      Gait Trainin     mins  86955;     Ultrasound:     0     mins  72249;    Electrical Stimulation:    0     mins  31172 ( );    Untimed:  Electrical Stimulation:    0     mins  36419 ( );  Mechanical Traction:    0     mins  43788;     Timed Treatment:   34   mins   Total Treatment:     34   mins      Jodie Lloyd, PT  Physical Therapist

## 2021-06-07 ENCOUNTER — DOCUMENTATION (OUTPATIENT)
Dept: PHYSICAL THERAPY | Facility: CLINIC | Age: 41
End: 2021-06-07

## 2021-06-07 DIAGNOSIS — G89.29 CHRONIC UPPER BACK PAIN: Primary | ICD-10-CM

## 2021-06-07 DIAGNOSIS — M54.9 CHRONIC UPPER BACK PAIN: Primary | ICD-10-CM

## 2021-06-07 NOTE — PROGRESS NOTES
Discharge Summary  Discharge Summary from Physical Therapy Report    Patient: Sarah Goel   : 1980  Diagnosis/ICD-10 Code:  The encounter diagnosis was Chronic upper back pain.   Referring practitioner: LAVONNE Qiu  Date of Initial Visit: Type: THERAPY  Noted: 2021  Today's Date: 2021      Number of Visits: 2     Date of Discharge: 2021    Goals: Not Met    Assessment/Reason for Discharge: non compliance, did not keep appts    Discharge Plan: Continue with current home exercise program as instructed            Jodie Lloyd, PT  Physical Therapist

## 2022-01-26 NOTE — ANESTHESIA PROCEDURE NOTES
Spinal Block      Patient reassessed immediately prior to procedure    Start Time: 10/29/2019 4:54 AM  Stop Time: 10/29/2019 4:55 AM  Indication:at surgeon's request  Performed By  Anesthesiologist: Lane Jalloh MD  Preanesthetic Checklist  Completed: patient identified, site marked, surgical consent, pre-op evaluation, timeout performed, IV checked, risks and benefits discussed and monitors and equipment checked  Spinal Block Prep:  Patient Position:sitting  Sterile Tech:cap, gloves, sterile barriers and mask  Prep:Betadine  Patient Monitoring:EKG, continuous pulse oximetry and blood pressure monitoring  Spinal Block Procedure  Approach:midline  Guidance:palpation technique  Location:L2-L3  Needle Type:Mecca  Needle Gauge:25 G  Placement of Spinal needle event:cerebrospinal fluid aspirated  Paresthesia: no  Fluid Appearance:clear  Medications: bupivacaine in dextrose (MARCAINE SPINAL / Hyberbaric) 0.75-8.25 % injection, 1.5 mL  morphine PF (ASTRAMORPH) injection, 0.15 mg  fentaNYL citrate (PF) (SUBLIMAZE) injection, 15 mcg  Med Administered at 10/29/2019 4:55 AM   Post Assessment  Patient Tolerance:patient tolerated the procedure well with no apparent complications  Complications no            
16

## 2022-02-26 ENCOUNTER — APPOINTMENT (OUTPATIENT)
Dept: CT IMAGING | Facility: HOSPITAL | Age: 42
End: 2022-02-26

## 2022-02-26 ENCOUNTER — HOSPITAL ENCOUNTER (EMERGENCY)
Facility: HOSPITAL | Age: 42
Discharge: HOME | End: 2022-02-26
Attending: EMERGENCY MEDICINE | Admitting: EMERGENCY MEDICINE

## 2022-02-26 VITALS
BODY MASS INDEX: 33.13 KG/M2 | SYSTOLIC BLOOD PRESSURE: 131 MMHG | HEIGHT: 62 IN | HEART RATE: 87 BPM | DIASTOLIC BLOOD PRESSURE: 76 MMHG | TEMPERATURE: 97.3 F | WEIGHT: 180 LBS | RESPIRATION RATE: 16 BRPM | OXYGEN SATURATION: 100 %

## 2022-02-26 DIAGNOSIS — R51.9 NONINTRACTABLE EPISODIC HEADACHE, UNSPECIFIED HEADACHE TYPE: Primary | ICD-10-CM

## 2022-02-26 PROCEDURE — 99282 EMERGENCY DEPT VISIT SF MDM: CPT

## 2022-02-26 PROCEDURE — 70450 CT HEAD/BRAIN W/O DYE: CPT

## 2024-07-24 ENCOUNTER — HOSPITAL ENCOUNTER (EMERGENCY)
Facility: HOSPITAL | Age: 44
Discharge: HOME | End: 2024-07-24
Attending: EMERGENCY MEDICINE
Payer: COMMERCIAL

## 2024-07-24 ENCOUNTER — APPOINTMENT (OUTPATIENT)
Facility: HOSPITAL | Age: 44
End: 2024-07-24
Payer: COMMERCIAL

## 2024-07-24 VITALS
BODY MASS INDEX: 34.04 KG/M2 | SYSTOLIC BLOOD PRESSURE: 128 MMHG | OXYGEN SATURATION: 95 % | HEART RATE: 73 BPM | TEMPERATURE: 98.4 F | HEIGHT: 62 IN | DIASTOLIC BLOOD PRESSURE: 73 MMHG | RESPIRATION RATE: 20 BRPM | WEIGHT: 185 LBS
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
## 2024-07-24 PROCEDURE — 99283 EMERGENCY DEPT VISIT LOW MDM: CPT

## 2024-07-24 PROCEDURE — 72040 X-RAY EXAM NECK SPINE 2-3 VW: CPT

## 2024-07-24 PROCEDURE — 72110 X-RAY EXAM L-2 SPINE 4/>VWS: CPT

## 2024-07-24 RX ORDER — CYCLOBENZAPRINE HCL 10 MG
10 TABLET ORAL 3 TIMES DAILY PRN
Qty: 12 TABLET | Refills: 0 | Status: SHIPPED | OUTPATIENT
Start: 2024-07-24

## 2024-07-24 RX ORDER — CYCLOBENZAPRINE HCL 10 MG
10 TABLET ORAL ONCE
Status: COMPLETED | OUTPATIENT
Start: 2024-07-24 | End: 2024-07-24

## 2024-07-24 RX ADMIN — CYCLOBENZAPRINE 10 MG: 10 TABLET, FILM COATED ORAL at 22:35

## 2024-07-24 NOTE — Clinical Note
[facility] EMERGENCY DEPARTMENT [place]  [address]  [place] [facility] [address]  Phone: [phone]  Fax: [phone]    Sarah Goel was seen and treated in our emergency department on 7/24/2024.  She may return to work on 07/29/2024.         Thank you for choosing [facility].    Fani Birch RN
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
## 2024-09-14 ENCOUNTER — HOSPITAL ENCOUNTER (EMERGENCY)
Facility: HOSPITAL | Age: 44
Discharge: HOME | End: 2024-09-14
Attending: EMERGENCY MEDICINE
Payer: COMMERCIAL

## 2024-09-14 ENCOUNTER — APPOINTMENT (OUTPATIENT)
Facility: HOSPITAL | Age: 44
End: 2024-09-14
Payer: COMMERCIAL

## 2024-09-14 VITALS
WEIGHT: 186.6 LBS | TEMPERATURE: 98.6 F | HEART RATE: 89 BPM | SYSTOLIC BLOOD PRESSURE: 122 MMHG | HEIGHT: 62 IN | OXYGEN SATURATION: 98 % | BODY MASS INDEX: 34.34 KG/M2 | RESPIRATION RATE: 16 BRPM | DIASTOLIC BLOOD PRESSURE: 70 MMHG

## 2024-09-14 DIAGNOSIS — M79.644 FINGER PAIN, RIGHT: Primary | ICD-10-CM

## 2024-09-14 PROCEDURE — 73130 X-RAY EXAM OF HAND: CPT

## 2024-09-14 PROCEDURE — 99285 EMERGENCY DEPT VISIT HI MDM: CPT

## 2024-09-14 PROCEDURE — 99283 EMERGENCY DEPT VISIT LOW MDM: CPT

## 2024-09-14 RX ORDER — PREDNISONE 20 MG/1
20 TABLET ORAL DAILY
Qty: 5 TABLET | Refills: 0 | Status: SHIPPED | OUTPATIENT
Start: 2024-09-14

## 2024-09-14 NOTE — FSED PROVIDER NOTE
Subjective  History of Present Illness:    Patient is a 44-year-old female presenting to the emergency department with complaints of finger pain.  States she woke up this morning with the pain without any known injury.  Pain localized to her right second PIP joint she denies pain with range of motion, fever      Nurses Notes reviewed and agree, including vitals, allergies, social history and prior medical history.     REVIEW OF SYSTEMS: All systems reviewed and not pertinent unless noted.  Review of Systems   Musculoskeletal:         Positive for hand pain   All other systems reviewed and are negative.      Past Medical History:   Diagnosis Date    Anxiety 2017    Gestational diabetes 2019    Preeclampsia 2001    Trichomonas infection 2019       Allergies:    Patient has no known allergies.      Past Surgical History:   Procedure Laterality Date    BREAST EXCISIONAL BIOPSY Right     WNL     SECTION N/A 10/29/2019    Procedure: Primary  LTCS (2 layer closure);  Surgeon: Tate Mullen MD;  Location: [facility] LABOR DELIVERY;  Service: Obstetrics/Gynecology    LAPAROSCOPIC CHOLECYSTECTOMY           Social History     Socioeconomic History    Marital status: Single   Tobacco Use    Smoking status: Every Day     Current packs/day: 1.00     Average packs/day: 1 pack/day for 31.7 years (31.7 ttl pk-yrs)     Types: Cigarettes     Start date:     Smokeless tobacco: Never   Substance and Sexual Activity    Alcohol use: No    Drug use: No     Comment: History of Marijuana Use    Sexual activity: Yes     Partners: Male     Birth control/protection: None         Family History   Problem Relation Age of Onset    Arthritis Mother     Thyroid disease Mother     Thyroid disease Sister     Diabetes Maternal Grandmother     Hypertension Maternal Grandmother     Breast cancer Neg Hx     Ovarian cancer Neg Hx     Uterine cancer Neg Hx     Endometrial cancer Neg Hx     Colon cancer Neg Hx   "      Objective  Physical Exam:  /70   Pulse 89   Temp 98.6 °F (37 °C) (Oral)   Resp 16   Ht 157.5 cm (62\")   Wt 84.6 kg (186 lb 9.6 oz)   SpO2 98%   BMI 34.13 kg/m²      Physical Exam  Vitals and nursing note reviewed.   Constitutional:       Appearance: Normal appearance. She is normal weight.   HENT:      Head: Normocephalic and atraumatic.   Eyes:      Extraocular Movements: Extraocular movements intact.      Pupils: Pupils are equal, round, and reactive to light.   Cardiovascular:      Rate and Rhythm: Normal rate.   Pulmonary:      Effort: Pulmonary effort is normal.   Musculoskeletal:         General: Normal range of motion.      Right hand: Tenderness present. No deformity. Normal range of motion.      Cervical back: Normal range of motion and neck supple.   Skin:     General: Skin is warm and dry.   Neurological:      General: No focal deficit present.      Mental Status: She is alert and oriented to person, place, and time. Mental status is at baseline.   Psychiatric:         Mood and Affect: Mood normal.         Behavior: Behavior normal.         Thought Content: Thought content normal.         Judgment: Judgment normal.         Procedures    ED Course:     Patient reports with pain in her right second finger without known injury.  Pain was present upon waking this morning.  Pain seems to be localized to the PIP joint.  She has full passive range of motion without difficulty.  No pain with palpation of the flexor tendon.  X-rays are negative for acute findings.  She was started on steroids for symptomatic management.    Lab Results (last 24 hours)       ** No results found for the last 24 hours. **             XR Hand 3+ View Right    Result Date: 9/14/2024  XR HAND 3+ VW RIGHT Date of Exam: 9/14/2024 3:46 PM EDT Indication: Right hand pain Comparison: None available. Findings: There is no acute fracture or dislocation. There are no osseous lesions. Bony mineralization is normal. The joint " spaces are well-maintained. The soft tissues are unremarkable.     Impression: Impression: Normal study. Electronically Signed: Bahman Aguilar MD  9/14/2024 4:09 PM EDT  Workstation ID: [id number]        MDM     Amount and/or Complexity of Data Reviewed  Tests in the radiology section of CPT®: reviewed  Tests in the medicine section of CPT®: reviewed          DDX: includes but is not limited to: Fracture, arthritis, sprain    Patient arrives POV with vitals interpreted by myself.     Pertinent features from physical exam: Full range of motion on exam.  Tenderness localized to right second PIP joint.      Medications - No data to display    Results/clinical rationale were discussed with patient      -----  ED Disposition       ED Disposition   Decision to Admit    Condition   --    Comment   --             Final diagnoses:   Finger pain, right      Your Follow-Up Providers       Jose Fermin DO.    Specialty: Family Medicine  Follow up details: recheck of symptoms  [address] [id number] [phone]                       Contact information for after-discharge care    Follow-up information has not been specified.                    Your medication list        START taking these medications        Instructions Last Dose Given Next Dose Due   predniSONE 20 MG tablet  Commonly known as: DELTASONE      Take 1 tablet by mouth Daily.              CONTINUE taking these medications        Instructions Last Dose Given Next Dose Due   cyclobenzaprine 10 MG tablet  Commonly known as: FLEXERIL      Take 1 tablet by mouth 3 (Three) Times a Day As Needed for Muscle Spasms.       ibuprofen 600 MG tablet  Commonly known as: ADVIL,MOTRIN      Take 1 tablet by mouth Every 6 (Six) Hours As Needed for Mild Pain .       norethindrone 0.35 MG tablet  Commonly known as: MICRONOR      TAKE 1 TABLET BY MOUTH EVERY DAY       Prenatal Vitamin 27-0.8 MG tablet      Take 1 tablet by mouth Daily.                 Where to Get  Your Medications        These medications were sent to [facility] [id number] - [place], [place] - [address] [place] [address] ROAD - [phone] PH - [phone]  [address] [facility] [address], [facility] [address]      Phone: [phone]   predniSONE 20 MG tablet

## 2024-10-30 ENCOUNTER — HOSPITAL ENCOUNTER (EMERGENCY)
Facility: HOSPITAL | Age: 44
Discharge: HOME | End: 2024-10-31
Attending: EMERGENCY MEDICINE
Payer: COMMERCIAL

## 2024-10-30 DIAGNOSIS — R11.2 NAUSEA AND VOMITING, UNSPECIFIED VOMITING TYPE: Primary | ICD-10-CM

## 2024-10-30 LAB
ALBUMIN SERPL-MCNC: 3.9 G/DL (ref 3.5–5.2)
ALBUMIN/GLOB SERPL: 1.2 G/DL
ALP SERPL-CCNC: 71 U/L (ref 39–117)
ALT SERPL W P-5'-P-CCNC: 5 U/L (ref 1–33)
ANION GAP SERPL CALCULATED.3IONS-SCNC: 11 MMOL/L (ref 5–15)
AST SERPL-CCNC: 20 U/L (ref 1–32)
BASOPHILS # BLD AUTO: 0.01 10*3/MM3 (ref 0–0.2)
BASOPHILS NFR BLD AUTO: 0.1 % (ref 0–1.5)
BILIRUB SERPL-MCNC: 0.5 MG/DL (ref 0–1.2)
BILIRUB UR QL STRIP: NEGATIVE
BUN SERPL-MCNC: 9 MG/DL (ref 6–20)
BUN/CREAT SERPL: 19.1 (ref 7–25)
CALCIUM SPEC-SCNC: 9.2 MG/DL (ref 8.6–10.5)
CHLORIDE SERPL-SCNC: 105 MMOL/L (ref 98–107)
CLARITY UR: CLEAR
CO2 SERPL-SCNC: 23 MMOL/L (ref 22–29)
COLOR UR: YELLOW
CREAT SERPL-MCNC: 0.47 MG/DL (ref 0.57–1)
DEPRECATED RDW RBC AUTO: 45.1 FL (ref 37–54)
EGFRCR SERPLBLD CKD-EPI 2021: 120.6 ML/MIN/1.73
EOSINOPHIL # BLD AUTO: 0.04 10*3/MM3 (ref 0–0.4)
EOSINOPHIL NFR BLD AUTO: 0.4 % (ref 0.3–6.2)
ERYTHROCYTE [DISTWIDTH] IN BLOOD BY AUTOMATED COUNT: 14 % (ref 12.3–15.4)
GLOBULIN UR ELPH-MCNC: 3.3 GM/DL
GLUCOSE SERPL-MCNC: 92 MG/DL (ref 65–99)
GLUCOSE UR STRIP-MCNC: NEGATIVE MG/DL
HCG INTACT+B SERPL-ACNC: <0.2 MIU/ML
HCT VFR BLD AUTO: 41.5 % (ref 34–46.6)
HGB BLD-MCNC: 14 G/DL (ref 12–15.9)
HGB UR QL STRIP.AUTO: NEGATIVE
HOLD SPECIMEN: NORMAL
IMM GRANULOCYTES # BLD AUTO: 0.03 10*3/MM3 (ref 0–0.05)
IMM GRANULOCYTES NFR BLD AUTO: 0.3 % (ref 0–0.5)
KETONES UR QL STRIP: NEGATIVE
LEUKOCYTE ESTERASE UR QL STRIP.AUTO: NEGATIVE
LIPASE SERPL-CCNC: 12 U/L (ref 13–60)
LYMPHOCYTES # BLD AUTO: 2.2 10*3/MM3 (ref 0.7–3.1)
LYMPHOCYTES NFR BLD AUTO: 22 % (ref 19.6–45.3)
MCH RBC QN AUTO: 29.5 PG (ref 26.6–33)
MCHC RBC AUTO-ENTMCNC: 33.7 G/DL (ref 31.5–35.7)
MCV RBC AUTO: 87.4 FL (ref 79–97)
MONOCYTES # BLD AUTO: 0.85 10*3/MM3 (ref 0.1–0.9)
MONOCYTES NFR BLD AUTO: 8.5 % (ref 5–12)
NEUTROPHILS NFR BLD AUTO: 6.85 10*3/MM3 (ref 1.7–7)
NEUTROPHILS NFR BLD AUTO: 68.7 % (ref 42.7–76)
NITRITE UR QL STRIP: NEGATIVE
PH UR STRIP.AUTO: 6 [PH] (ref 5–8)
PLATELET # BLD AUTO: 293 10*3/MM3 (ref 140–450)
PMV BLD AUTO: 9.7 FL (ref 6–12)
POTASSIUM SERPL-SCNC: 3.8 MMOL/L (ref 3.5–5.2)
PROT SERPL-MCNC: 7.2 G/DL (ref 6–8.5)
PROT UR QL STRIP: NEGATIVE
RBC # BLD AUTO: 4.75 10*6/MM3 (ref 3.77–5.28)
SODIUM SERPL-SCNC: 139 MMOL/L (ref 136–145)
SP GR UR STRIP: >=1.03 (ref 1–1.03)
UROBILINOGEN UR QL STRIP: NORMAL
WBC NRBC COR # BLD AUTO: 9.98 10*3/MM3 (ref 3.4–10.8)
WHOLE BLOOD HOLD COAG: NORMAL
WHOLE BLOOD HOLD SPECIMEN: NORMAL

## 2024-10-30 PROCEDURE — 96375 TX/PRO/DX INJ NEW DRUG ADDON: CPT

## 2024-10-30 PROCEDURE — 99283 EMERGENCY DEPT VISIT LOW MDM: CPT

## 2024-10-30 PROCEDURE — 25010000002 ONDANSETRON PER 1 MG: Performed by: PHYSICIAN ASSISTANT

## 2024-10-30 PROCEDURE — 85025 COMPLETE CBC W/AUTO DIFF WBC: CPT | Performed by: EMERGENCY MEDICINE

## 2024-10-30 PROCEDURE — 80053 COMPREHEN METABOLIC PANEL: CPT | Performed by: EMERGENCY MEDICINE

## 2024-10-30 PROCEDURE — 81003 URINALYSIS AUTO W/O SCOPE: CPT | Performed by: EMERGENCY MEDICINE

## 2024-10-30 PROCEDURE — 83690 ASSAY OF LIPASE: CPT | Performed by: EMERGENCY MEDICINE

## 2024-10-30 PROCEDURE — 96374 THER/PROPH/DIAG INJ IV PUSH: CPT

## 2024-10-30 PROCEDURE — 25010000002 KETOROLAC TROMETHAMINE PER 15 MG: Performed by: PHYSICIAN ASSISTANT

## 2024-10-30 PROCEDURE — 84702 CHORIONIC GONADOTROPIN TEST: CPT | Performed by: EMERGENCY MEDICINE

## 2024-10-30 RX ORDER — PANTOPRAZOLE SODIUM 40 MG/10ML
40 INJECTION, POWDER, LYOPHILIZED, FOR SOLUTION INTRAVENOUS ONCE
Status: COMPLETED | OUTPATIENT
Start: 2024-10-30 | End: 2024-10-30

## 2024-10-30 RX ORDER — FAMOTIDINE 10 MG/ML
20 INJECTION, SOLUTION INTRAVENOUS ONCE
Status: COMPLETED | OUTPATIENT
Start: 2024-10-30 | End: 2024-10-30

## 2024-10-30 RX ORDER — SODIUM CHLORIDE 9 MG/ML
10 INJECTION, SOLUTION INTRAMUSCULAR; INTRAVENOUS; SUBCUTANEOUS AS NEEDED
Status: DISCONTINUED | OUTPATIENT
Start: 2024-10-30 | End: 2024-10-31 | Stop reason: HOSPADM

## 2024-10-30 RX ORDER — FAMOTIDINE 20 MG/1
20 TABLET, FILM COATED ORAL 2 TIMES DAILY PRN
Qty: 20 TABLET | Refills: 0 | Status: SHIPPED | OUTPATIENT
Start: 2024-10-30

## 2024-10-30 RX ORDER — ONDANSETRON 4 MG/1
4 TABLET, FILM COATED ORAL EVERY 4 HOURS PRN
Qty: 16 TABLET | Refills: 0 | Status: SHIPPED | OUTPATIENT
Start: 2024-10-30

## 2024-10-30 RX ORDER — DICYCLOMINE HYDROCHLORIDE 10 MG/1
20 CAPSULE ORAL ONCE
Status: COMPLETED | OUTPATIENT
Start: 2024-10-30 | End: 2024-10-30

## 2024-10-30 RX ORDER — DICYCLOMINE HYDROCHLORIDE 10 MG/1
10 CAPSULE ORAL 3 TIMES DAILY PRN
Qty: 30 CAPSULE | Refills: 0 | Status: SHIPPED | OUTPATIENT
Start: 2024-10-30

## 2024-10-30 RX ORDER — KETOROLAC TROMETHAMINE 10 MG/1
10 TABLET, FILM COATED ORAL EVERY 6 HOURS PRN
Qty: 12 TABLET | Refills: 0 | Status: SHIPPED | OUTPATIENT
Start: 2024-10-30

## 2024-10-30 RX ORDER — ONDANSETRON 2 MG/ML
4 INJECTION INTRAMUSCULAR; INTRAVENOUS ONCE
Status: COMPLETED | OUTPATIENT
Start: 2024-10-30 | End: 2024-10-30

## 2024-10-30 RX ORDER — KETOROLAC TROMETHAMINE 30 MG/ML
30 INJECTION, SOLUTION INTRAMUSCULAR; INTRAVENOUS ONCE
Status: COMPLETED | OUTPATIENT
Start: 2024-10-30 | End: 2024-10-30

## 2024-10-30 RX ADMIN — KETOROLAC TROMETHAMINE 30 MG: 30 INJECTION, SOLUTION INTRAMUSCULAR; INTRAVENOUS at 23:19

## 2024-10-30 RX ADMIN — ONDANSETRON 4 MG: 2 INJECTION INTRAMUSCULAR; INTRAVENOUS at 21:48

## 2024-10-30 RX ADMIN — FAMOTIDINE 20 MG: 10 INJECTION, SOLUTION INTRAVENOUS at 21:48

## 2024-10-30 RX ADMIN — PANTOPRAZOLE SODIUM 40 MG: 40 INJECTION, POWDER, LYOPHILIZED, FOR SOLUTION INTRAVENOUS at 22:27

## 2024-10-30 RX ADMIN — DICYCLOMINE HYDROCHLORIDE 20 MG: 10 CAPSULE ORAL at 22:27

## 2024-10-31 VITALS
BODY MASS INDEX: 33.86 KG/M2 | HEIGHT: 62 IN | RESPIRATION RATE: 16 BRPM | HEART RATE: 76 BPM | OXYGEN SATURATION: 98 % | TEMPERATURE: 98.3 F | SYSTOLIC BLOOD PRESSURE: 107 MMHG | DIASTOLIC BLOOD PRESSURE: 88 MMHG | WEIGHT: 184 LBS

## 2024-10-31 NOTE — FSED PROVIDER NOTE
Subjective   History of Present Illness  44-year-old female who presents emergency room with epigastric pain and nausea and vomiting.  She reports has been increasing over the last 24 to 48 hours patient states that she has not been around the body that has been ill.  Took Zofran at home with only minimal relief.  Patient denies lower abdominal pain, fever, chest pain, shortness of breath, difficulty breathing, headache, neck pain, numbness, back pain.  Patient denies pregnancy.  Patient is a significant past medical history of anxiety.     History provided by:  Patient   used: No        Review of Systems   Gastrointestinal:  Positive for nausea and vomiting.   All other systems reviewed and are negative.      Past Medical History:   Diagnosis Date    Anxiety 2017    Gestational diabetes 2019    Preeclampsia     Trichomonas infection 2019       No Known Allergies    Past Surgical History:   Procedure Laterality Date    BREAST EXCISIONAL BIOPSY Right 1998    WNL     SECTION N/A 10/29/2019    Procedure: Primary  LTCS (2 layer closure);  Surgeon: Tate Mullen MD;  Location: [facility] LABOR DELIVERY;  Service: Obstetrics/Gynecology    LAPAROSCOPIC CHOLECYSTECTOMY         Family History   Problem Relation Age of Onset    Arthritis Mother     Thyroid disease Mother     Thyroid disease Sister     Diabetes Maternal Grandmother     Hypertension Maternal Grandmother     Breast cancer Neg Hx     Ovarian cancer Neg Hx     Uterine cancer Neg Hx     Endometrial cancer Neg Hx     Colon cancer Neg Hx        Social History     Socioeconomic History    Marital status: Single   Tobacco Use    Smoking status: Every Day     Current packs/day: 1.00     Average packs/day: 1 pack/day for 31.8 years (31.8 ttl pk-yrs)     Types: Cigarettes     Start date:     Smokeless tobacco: Never   Substance and Sexual Activity    Alcohol use: No    Drug use: No     Comment: History of Marijuana Use     Sexual activity: Yes     Partners: Male     Birth control/protection: None           Objective   Physical Exam  Vitals and nursing note reviewed.   Constitutional:       Appearance: Normal appearance. She is normal weight.   HENT:      Head: Normocephalic.      Nose: Nose normal.   Eyes:      Pupils: Pupils are equal, round, and reactive to light.   Cardiovascular:      Rate and Rhythm: Normal rate and regular rhythm.   Pulmonary:      Effort: Pulmonary effort is normal.      Breath sounds: Normal breath sounds.   Abdominal:      General: Abdomen is flat.      Tenderness: There is no abdominal tenderness.   Musculoskeletal:         General: Normal range of motion.   Skin:     General: Skin is warm and dry.   Neurological:      General: No focal deficit present.      Mental Status: She is alert and oriented to person, place, and time. Mental status is at baseline.   Psychiatric:         Mood and Affect: Mood normal.         Behavior: Behavior normal.         Thought Content: Thought content normal.         Judgment: Judgment normal.         Procedures           ED Course                                           Medical Decision Making  44-year-old female with nausea and vomiting.  Differential diagnosis includes gastritis, nausea, vomiting, bowel obstruction, gastritis.  There are no pertinent positives of physical exam, no tenderness on abdominal palpation.  Diagnostic valuation included CBC, CMP, lipase, urine.  I also discussed a CT scan with patient, she states that she does not want this, she states she believes this is just a stomach virus.  Patient is given Zofran, Pepcid, Bentyl, Protonix, Toradol in emergency department with complete resolution of symptoms.  Patient's labs returned nonactionable.  Will discharge patient home to follow-up appropriately outpatient with primary care physician.    Amount and/or Complexity of Data Reviewed  Labs: ordered.    Risk  Prescription drug management.        Final  diagnoses:   Nausea and vomiting, unspecified vomiting type       ED Disposition  ED Disposition       ED Disposition   Discharge    Condition   Stable    Comment   --               Jose Fermin DO  [address] [id number]  [phone]    Schedule an appointment as soon as possible for a visit       [facility] EMERGENCY DEPARTMENT [place]  [address]  [facility] [id number] [phone]  Go to   As needed, If symptoms worsen         Medication List        New Prescriptions      dicyclomine 10 MG capsule  Commonly known as: BENTYL  Take 1 capsule by mouth 3 (Three) Times a Day As Needed for Abdominal Cramping.     famotidine 20 MG tablet  Commonly known as: PEPCID  Take 1 tablet by mouth 2 (Two) Times a Day As Needed for Heartburn or Indigestion.     ketorolac 10 MG tablet  Commonly known as: TORADOL  Take 1 tablet by mouth Every 6 (Six) Hours As Needed for Moderate Pain.     ondansetron 4 MG tablet  Commonly known as: ZOFRAN  Take 1 tablet by mouth Every 4 (Four) Hours As Needed for Nausea or Vomiting.               Where to Get Your Medications        These medications were sent to [facility] [id number] - [place], [place] - [address] - [phone]  - [phone]   [address], [facility] [address]      Phone: [phone]   dicyclomine 10 MG capsule  famotidine 20 MG tablet  ketorolac 10 MG tablet  ondansetron 4 MG tablet

## 2024-11-08 ENCOUNTER — HOSPITAL ENCOUNTER (EMERGENCY)
Facility: HOSPITAL | Age: 44
Discharge: HOME | End: 2024-11-08
Attending: EMERGENCY MEDICINE
Payer: COMMERCIAL

## 2024-11-08 ENCOUNTER — APPOINTMENT (OUTPATIENT)
Facility: HOSPITAL | Age: 44
End: 2024-11-08
Payer: COMMERCIAL

## 2024-11-08 VITALS
HEIGHT: 62 IN | BODY MASS INDEX: 33.13 KG/M2 | OXYGEN SATURATION: 99 % | TEMPERATURE: 98.2 F | HEART RATE: 70 BPM | WEIGHT: 180 LBS | RESPIRATION RATE: 18 BRPM | DIASTOLIC BLOOD PRESSURE: 81 MMHG | SYSTOLIC BLOOD PRESSURE: 131 MMHG

## 2024-11-08 DIAGNOSIS — R10.13 EPIGASTRIC PAIN: Primary | ICD-10-CM

## 2024-11-08 LAB
ALBUMIN SERPL-MCNC: 4.1 G/DL (ref 3.5–5.2)
ALBUMIN/GLOB SERPL: 1.4 G/DL
ALP SERPL-CCNC: 65 U/L (ref 39–117)
ALT SERPL W P-5'-P-CCNC: 6 U/L (ref 1–33)
ANION GAP SERPL CALCULATED.3IONS-SCNC: 9.1 MMOL/L (ref 5–15)
AST SERPL-CCNC: 23 U/L (ref 1–32)
BASOPHILS # BLD AUTO: 0.02 10*3/MM3 (ref 0–0.2)
BASOPHILS NFR BLD AUTO: 0.2 % (ref 0–1.5)
BILIRUB SERPL-MCNC: 0.3 MG/DL (ref 0–1.2)
BUN SERPL-MCNC: 12 MG/DL (ref 6–20)
BUN/CREAT SERPL: 25.5 (ref 7–25)
CALCIUM SPEC-SCNC: 9.1 MG/DL (ref 8.6–10.5)
CHLORIDE SERPL-SCNC: 106 MMOL/L (ref 98–107)
CO2 SERPL-SCNC: 22.9 MMOL/L (ref 22–29)
CREAT SERPL-MCNC: 0.47 MG/DL (ref 0.57–1)
DEPRECATED RDW RBC AUTO: 44.4 FL (ref 37–54)
EGFRCR SERPLBLD CKD-EPI 2021: 120.6 ML/MIN/1.73
EOSINOPHIL # BLD AUTO: 0.06 10*3/MM3 (ref 0–0.4)
EOSINOPHIL NFR BLD AUTO: 0.7 % (ref 0.3–6.2)
ERYTHROCYTE [DISTWIDTH] IN BLOOD BY AUTOMATED COUNT: 13.7 % (ref 12.3–15.4)
GLOBULIN UR ELPH-MCNC: 3 GM/DL
GLUCOSE SERPL-MCNC: 107 MG/DL (ref 65–99)
HCG INTACT+B SERPL-ACNC: <0.2 MIU/ML
HCT VFR BLD AUTO: 41.4 % (ref 34–46.6)
HGB BLD-MCNC: 13.6 G/DL (ref 12–15.9)
IMM GRANULOCYTES # BLD AUTO: 0.02 10*3/MM3 (ref 0–0.05)
IMM GRANULOCYTES NFR BLD AUTO: 0.2 % (ref 0–0.5)
LIPASE SERPL-CCNC: 15 U/L (ref 13–60)
LYMPHOCYTES # BLD AUTO: 1.62 10*3/MM3 (ref 0.7–3.1)
LYMPHOCYTES NFR BLD AUTO: 19.7 % (ref 19.6–45.3)
MCH RBC QN AUTO: 28.5 PG (ref 26.6–33)
MCHC RBC AUTO-ENTMCNC: 32.9 G/DL (ref 31.5–35.7)
MCV RBC AUTO: 86.6 FL (ref 79–97)
MONOCYTES # BLD AUTO: 0.61 10*3/MM3 (ref 0.1–0.9)
MONOCYTES NFR BLD AUTO: 7.4 % (ref 5–12)
NEUTROPHILS NFR BLD AUTO: 5.91 10*3/MM3 (ref 1.7–7)
NEUTROPHILS NFR BLD AUTO: 71.8 % (ref 42.7–76)
PLATELET # BLD AUTO: 339 10*3/MM3 (ref 140–450)
PMV BLD AUTO: 9.4 FL (ref 6–12)
POTASSIUM SERPL-SCNC: 3.8 MMOL/L (ref 3.5–5.2)
PROT SERPL-MCNC: 7.1 G/DL (ref 6–8.5)
RBC # BLD AUTO: 4.78 10*6/MM3 (ref 3.77–5.28)
SODIUM SERPL-SCNC: 138 MMOL/L (ref 136–145)
WBC NRBC COR # BLD AUTO: 8.24 10*3/MM3 (ref 3.4–10.8)

## 2024-11-08 PROCEDURE — 25010000002 ONDANSETRON PER 1 MG: Performed by: EMERGENCY MEDICINE

## 2024-11-08 PROCEDURE — 85025 COMPLETE CBC W/AUTO DIFF WBC: CPT | Performed by: EMERGENCY MEDICINE

## 2024-11-08 PROCEDURE — 99285 EMERGENCY DEPT VISIT HI MDM: CPT

## 2024-11-08 PROCEDURE — 25510000001 IOPAMIDOL 61 % SOLUTION: Performed by: EMERGENCY MEDICINE

## 2024-11-08 PROCEDURE — 84702 CHORIONIC GONADOTROPIN TEST: CPT | Performed by: EMERGENCY MEDICINE

## 2024-11-08 PROCEDURE — 74177 CT ABD & PELVIS W/CONTRAST: CPT

## 2024-11-08 PROCEDURE — 80053 COMPREHEN METABOLIC PANEL: CPT | Performed by: EMERGENCY MEDICINE

## 2024-11-08 PROCEDURE — 96375 TX/PRO/DX INJ NEW DRUG ADDON: CPT

## 2024-11-08 PROCEDURE — 83690 ASSAY OF LIPASE: CPT | Performed by: EMERGENCY MEDICINE

## 2024-11-08 PROCEDURE — 96374 THER/PROPH/DIAG INJ IV PUSH: CPT

## 2024-11-08 RX ORDER — ONDANSETRON 2 MG/ML
4 INJECTION INTRAMUSCULAR; INTRAVENOUS ONCE
Status: COMPLETED | OUTPATIENT
Start: 2024-11-08 | End: 2024-11-08

## 2024-11-08 RX ORDER — FAMOTIDINE 10 MG/ML
20 INJECTION, SOLUTION INTRAVENOUS ONCE
Status: COMPLETED | OUTPATIENT
Start: 2024-11-08 | End: 2024-11-08

## 2024-11-08 RX ORDER — IOPAMIDOL 612 MG/ML
100 INJECTION, SOLUTION INTRAVASCULAR
Status: COMPLETED | OUTPATIENT
Start: 2024-11-08 | End: 2024-11-08

## 2024-11-08 RX ORDER — ALUMINA, MAGNESIA, AND SIMETHICONE 2400; 2400; 240 MG/30ML; MG/30ML; MG/30ML
15 SUSPENSION ORAL ONCE
Status: COMPLETED | OUTPATIENT
Start: 2024-11-08 | End: 2024-11-08

## 2024-11-08 RX ORDER — SUCRALFATE 1 G/1
1 TABLET ORAL 4 TIMES DAILY
Qty: 28 TABLET | Refills: 0 | Status: SHIPPED | OUTPATIENT
Start: 2024-11-08 | End: 2024-11-15

## 2024-11-08 RX ADMIN — ONDANSETRON 4 MG: 2 INJECTION INTRAMUSCULAR; INTRAVENOUS at 05:36

## 2024-11-08 RX ADMIN — FAMOTIDINE 20 MG: 10 INJECTION, SOLUTION INTRAVENOUS at 05:36

## 2024-11-08 RX ADMIN — IOPAMIDOL 100 ML: 612 INJECTION, SOLUTION INTRAVENOUS at 06:16

## 2024-11-08 RX ADMIN — ALUMINUM HYDROXIDE, MAGNESIUM HYDROXIDE, DIMETHICONE 15 ML: 400; 400; 40 SUSPENSION ORAL at 05:38

## 2024-11-08 NOTE — FSED PROVIDER NOTE
Subjective  History of Present Illness:    Patient presents to the emergency department with intermittent epigastric abdominal tenderness.  She was just seen here several days ago with similar symptoms.  Has a history of GERD.  Denies any fever, chills, cough, congestion.  Does describe some associated diarrhea but denies any urinary symptoms.  States that she was discharged home with famotidine and has been taking this at home.  States that her pain is mostly a burning sensation.  Status post cholecystectomy several years ago    Nurses Notes reviewed and agree, including vitals, allergies, social history and prior medical history.     REVIEW OF SYSTEMS: All systems reviewed and not pertinent unless noted.  Review of Systems   Gastrointestinal:  Positive for abdominal pain, diarrhea, nausea and vomiting.   All other systems reviewed and are negative.      Past Medical History:   Diagnosis Date    Anxiety     Gestational diabetes 2019    Preeclampsia 2001    Trichomonas infection 2019       Allergies:    Patient has no known allergies.      Past Surgical History:   Procedure Laterality Date    BREAST EXCISIONAL BIOPSY Right     WNL     SECTION N/A 10/29/2019    Procedure: Primary  LTCS (2 layer closure);  Surgeon: Tate Mullen MD;  Location: [facility] LABOR DELIVERY;  Service: Obstetrics/Gynecology    LAPAROSCOPIC CHOLECYSTECTOMY           Social History     Socioeconomic History    Marital status: Single   Tobacco Use    Smoking status: Every Day     Current packs/day: 1.00     Average packs/day: 1 pack/day for 31.9 years (31.9 ttl pk-yrs)     Types: Cigarettes     Start date:     Smokeless tobacco: Never   Substance and Sexual Activity    Alcohol use: No    Drug use: No     Comment: History of Marijuana Use    Sexual activity: Yes     Partners: Male     Birth control/protection: None         Family History   Problem Relation Age of Onset    Arthritis Mother     Thyroid disease Mother  "    Thyroid disease Sister     Diabetes Maternal Grandmother     Hypertension Maternal Grandmother     Breast cancer Neg Hx     Ovarian cancer Neg Hx     Uterine cancer Neg Hx     Endometrial cancer Neg Hx     Colon cancer Neg Hx        Objective  Physical Exam:  /88   Pulse 63   Temp 98.2 °F (36.8 °C)   Resp 18   Ht 157.5 cm (62\")   Wt 81.6 kg (180 lb)   LMP 09/27/2024 (Exact Date)   SpO2 100%   BMI 32.92 kg/m²      Physical Exam  Vitals and nursing note reviewed.   Constitutional:       General: She is not in acute distress.     Appearance: Normal appearance. She is normal weight. She is not ill-appearing, toxic-appearing or diaphoretic.   HENT:      Head: Normocephalic and atraumatic.      Mouth/Throat:      Mouth: Mucous membranes are moist.   Eyes:      Extraocular Movements: Extraocular movements intact.      Conjunctiva/sclera: Conjunctivae normal.      Pupils: Pupils are equal, round, and reactive to light.   Cardiovascular:      Rate and Rhythm: Normal rate and regular rhythm.      Pulses: Normal pulses.      Heart sounds: Normal heart sounds. No murmur heard.     No gallop.   Pulmonary:      Effort: Pulmonary effort is normal.      Breath sounds: Normal breath sounds. No wheezing or rales.   Abdominal:      Comments: Soft, nondistended, epigastric abdominal tenderness with guarding.  No rebound.  Normal bowel sounds.   Musculoskeletal:         General: Normal range of motion.   Skin:     General: Skin is warm and dry.      Capillary Refill: Capillary refill takes less than 2 seconds.   Neurological:      General: No focal deficit present.      Mental Status: She is alert and oriented to person, place, and time.   Psychiatric:         Mood and Affect: Mood normal.         Behavior: Behavior normal.         Thought Content: Thought content normal.         Judgment: Judgment normal.         Procedures    ED Course:         Lab Results (last 24 hours)       Procedure Component Value Units " Date/Time    CBC & Differential [[id number]]  (Normal) Collected: 11/08/24 0532    Specimen: Blood Updated: 11/08/24 0537    Narrative:      The following orders were created for panel order CBC & Differential.  Procedure                               Abnormality         Status                     ---------                               -----------         ------                     CBC Auto Differential[[id number]]        Normal              Final result                 Please view results for these tests on the individual orders.    Comprehensive Metabolic Panel [[id number]]  (Abnormal) Collected: 11/08/24 0532    Specimen: Blood Updated: 11/08/24 0555     Glucose 107 mg/dL      BUN 12 mg/dL      Creatinine 0.47 mg/dL      Sodium 138 mmol/L      Potassium 3.8 mmol/L      Comment: Specimen hemolyzed.  Result may be falsely elevated.        Chloride 106 mmol/L      CO2 22.9 mmol/L      Calcium 9.1 mg/dL      Total Protein 7.1 g/dL      Albumin 4.1 g/dL      ALT (SGPT) 6 U/L      AST (SGOT) 23 U/L      Comment: Specimen hemolyzed.  Result may be falsely elevated.        Alkaline Phosphatase 65 U/L      Total Bilirubin 0.3 mg/dL      Globulin 3.0 gm/dL      A/G Ratio 1.4 g/dL      BUN/Creatinine Ratio 25.5     Anion Gap 9.1 mmol/L      eGFR 120.6 mL/min/1.73     Narrative:      GFR Normal >60  Chronic Kidney Disease <60  Kidney Failure <15      Lipase [[id number]]  (Normal) Collected: 11/08/24 0532    Specimen: Blood Updated: 11/08/24 0555     Lipase 15 U/L     hCG, Quantitative, Pregnancy [[id number]] Collected: 11/08/24 0532    Specimen: Blood Updated: 11/08/24 0605     HCG Quantitative <0.20 mIU/mL     Narrative:      HCG Ranges by Gestational Age    Females - non-pregnant premenopausal   </= 1mIU/mL HCG  Females - postmenopausal               </= 7mIU/mL HCG    3 Weeks         5.8 -    71.2 mIU/mL  4 Weeks         9.5 -     750 mIU/mL  5 Weeks         217 -   7,138 mIU/mL  6 Weeks         158 -  31,795 mIU/mL  7 Weeks        3,697 - 163,563 mIU/mL  8 Weeks      32,065 - 149,571 mIU/mL  9 Weeks      63,803 - 151,410 mIU/mL  10 Weeks     46,509 - 186,977 mIU/mL  12 Weeks     27,832 - 210,612 mIU/mL  14 Weeks     13,950 -  62,530 mIU/mL  15 Weeks     12,039 -  70,971 mIU/mL  16 Weeks      9,040 -  56,451 mIU/mL  17 Weeks      8,175 -  55,868 mIU/mL  18 Weeks      8,099 -  58,176 mIU/mL  Results may be falsely decreased if patient taking Biotin.      CBC Auto Differential [[id number]]  (Normal) Collected: 11/08/24 0532    Specimen: Blood Updated: 11/08/24 0537     WBC 8.24 10*3/mm3      RBC 4.78 10*6/mm3      Hemoglobin 13.6 g/dL      Hematocrit 41.4 %      MCV 86.6 fL      MCH 28.5 pg      MCHC 32.9 g/dL      RDW 13.7 %      RDW-SD 44.4 fl      MPV 9.4 fL      Platelets 339 10*3/mm3      Neutrophil % 71.8 %      Lymphocyte % 19.7 %      Monocyte % 7.4 %      Eosinophil % 0.7 %      Basophil % 0.2 %      Immature Grans % 0.2 %      Neutrophils, Absolute 5.91 10*3/mm3      Lymphocytes, Absolute 1.62 10*3/mm3      Monocytes, Absolute 0.61 10*3/mm3      Eosinophils, Absolute 0.06 10*3/mm3      Basophils, Absolute 0.02 10*3/mm3      Immature Grans, Absolute 0.02 10*3/mm3              CT Abdomen Pelvis With Contrast    Result Date: 11/8/2024  CT ABDOMEN PELVIS W CONTRAST Date of Exam: 11/8/2024 6:08 AM EST Indication: epigastric pain. Comparison: None available. Technique: Axial CT images were obtained of the abdomen and pelvis following the uneventful intravenous administration of 100 mL Isovue-300. Reconstructed coronal and sagittal images were also obtained. Automated exposure control and iterative construction methods were used. Findings: Lung Bases:   The visualized lung bases and lower mediastinal structures are unremarkable. Liver: Liver is normal in size and CT density. No focal lesions. Biliary/Gallbladder:  The gallbladder is surgically absent. The biliary tree is nondilated. Spleen: Spleen is normal in size and CT density.  Pancreas:  Pancreas is normal. There is no evidence of pancreatic mass or peripancreatic fluid. Kidneys:  Kidneys are normal in size. There are no stones or hydronephrosis. Adrenals:  Adrenal glands are unremarkable. Retroperitoneal/Lymph Nodes/Vasculature:  No retroperitoneal adenopathy is identified. Gastrointestinal/Mesentery:  The bowel loops are non-dilated without wall thickening or mass. The appendix appears within normal limits. No evidence of obstruction. No free air. No mesenteric fluid collections identified. Bladder:  The bladder is normal. Genital:   Unremarkable       Bony Structures:   Visualized bony structures are consistent with the patient's age.     Impression: Impression: No acute abdominal or pelvic abnormality. Electronically Signed: Jamir Rojo MD  11/8/2024 6:28 AM EST  Workstation ID: [id number]        MDM  Number of Diagnoses or Management Options  Diagnosis management comments: Patient was evaluated and CT scan was negative.  Blood work was not concerning at this time.  This seems to be gastric related.  The patient was discharged home to follow-up with GI on an outpatient basis and return to the emergency department any worsening symptoms       Amount and/or Complexity of Data Reviewed  Clinical lab tests: reviewed  Tests in the radiology section of CPT®: reviewed        Medications   famotidine (PEPCID) injection 20 mg (20 mg Intravenous Given 11/8/24 0536)   ondansetron (ZOFRAN) injection 4 mg (4 mg Intravenous Given 11/8/24 0536)   aluminum-magnesium hydroxide-simethicone (MAALOX MAX) 400-400-40 MG/5ML suspension 15 mL (15 mL Oral Given 11/8/24 0538)   iopamidol (ISOVUE-300) 61 % injection 100 mL (100 mL Intravenous Given 11/8/24 0616)       Data interpreted: Nursing notes reviewed, vital signs reviewed.  Labs independently interpreted by me (CBC, CMP, lipase, UA, troponin, ABG, lactic acid, procalcitonin).  Imaging independently interpreted by me (x-ray, CT scan).  EKG independently  interpreted by me.  O2 saturation:    Counseling: Discussed the results above with the patient regarding need for admission or discharge.  Patient understands and agrees plan of care.      -----  ED Disposition       ED Disposition   Discharge    Condition   Stable    Comment   --             Final diagnoses:   Epigastric pain      Your Follow-Up Providers       Jose Fermin DO. Call in 3 days.    Specialty: Family Medicine  [address] [facility] [address]  [phone]               Brunner, Mark I, MD.    Specialty: Gastroenterology  [facility] [facility] [address]  [facility] [address]  [phone]                       Contact information for after-discharge care    Follow-up information has not been specified.                    Your medication list        START taking these medications        Instructions Last Dose Given Next Dose Due   sucralfate 1 g tablet  Commonly known as: CARAFATE      Take 1 tablet by mouth 4 (Four) Times a Day for 7 days.              CONTINUE taking these medications        Instructions Last Dose Given Next Dose Due   cyclobenzaprine 10 MG tablet  Commonly known as: FLEXERIL      Take 1 tablet by mouth 3 (Three) Times a Day As Needed for Muscle Spasms.       dicyclomine 10 MG capsule  Commonly known as: BENTYL      Take 1 capsule by mouth 3 (Three) Times a Day As Needed for Abdominal Cramping.       famotidine 20 MG tablet  Commonly known as: PEPCID      Take 1 tablet by mouth 2 (Two) Times a Day As Needed for Heartburn or Indigestion.       ibuprofen 600 MG tablet  Commonly known as: ADVIL,MOTRIN      Take 1 tablet by mouth Every 6 (Six) Hours As Needed for Mild Pain .       ketorolac 10 MG tablet  Commonly known as: TORADOL      Take 1 tablet by mouth Every 6 (Six) Hours As Needed for Moderate Pain.       norethindrone 0.35 MG tablet  Commonly known as: MICRONOR      TAKE 1 TABLET BY MOUTH EVERY DAY       ondansetron 4 MG tablet  Commonly known as: ZOFRAN      Take 1  tablet by mouth Every 4 (Four) Hours As Needed for Nausea or Vomiting.       predniSONE 20 MG tablet  Commonly known as: DELTASONE      Take 1 tablet by mouth Daily.       Prenatal Vitamin 27-0.8 MG tablet      Take 1 tablet by mouth Daily.                 Where to Get Your Medications        These medications were sent to [facility] [id number] - [place], [place] - [address] [address] [facility] - [phone]  - [phone]   [address] [place] [address]      Phone: [phone]   sucralfate 1 g tablet

## (undated) DEVICE — PK C/SECT 10

## (undated) DEVICE — TRY SPINE BLCK WHITACRE 25G 3X5IN

## (undated) DEVICE — SUT MNCRYL 0/0 CTX 36IN Y398H

## (undated) DEVICE — GLV SURG BIOGEL LTX PF 6

## (undated) DEVICE — SOL IRR H2O BTL 1000ML STRL

## (undated) DEVICE — MAT PREVALON MOBL TRANSFR AIR W/PAD 39X80IN

## (undated) DEVICE — COATED VICRYL  (POLYGLACTIN 910) SUTURE, VIOLET BRAIDED, STERILE, SYNTHETIC ABSORBABLE SUTURE: Brand: COATED VICRYL

## (undated) DEVICE — SUT MONOCRYL SZ 4 0 18IN PS1 Y682H BX/36

## (undated) DEVICE — SOL IRR NACL 0.9PCT BT 1000ML